# Patient Record
Sex: FEMALE | Race: WHITE | NOT HISPANIC OR LATINO | Employment: UNEMPLOYED | URBAN - METROPOLITAN AREA
[De-identification: names, ages, dates, MRNs, and addresses within clinical notes are randomized per-mention and may not be internally consistent; named-entity substitution may affect disease eponyms.]

---

## 2023-04-27 ENCOUNTER — LAB (OUTPATIENT)
Dept: LAB | Facility: LAB | Age: 83
End: 2023-04-27
Payer: MEDICARE

## 2023-04-27 ENCOUNTER — OFFICE VISIT (OUTPATIENT)
Dept: PRIMARY CARE | Facility: CLINIC | Age: 83
End: 2023-04-27
Payer: MEDICARE

## 2023-04-27 VITALS
TEMPERATURE: 97.8 F | WEIGHT: 130 LBS | OXYGEN SATURATION: 99 % | SYSTOLIC BLOOD PRESSURE: 128 MMHG | HEART RATE: 88 BPM | RESPIRATION RATE: 16 BRPM | DIASTOLIC BLOOD PRESSURE: 70 MMHG | BODY MASS INDEX: 23.4 KG/M2

## 2023-04-27 DIAGNOSIS — R30.0 DYSURIA: Primary | ICD-10-CM

## 2023-04-27 DIAGNOSIS — C50.911 MALIGNANT NEOPLASM OF RIGHT FEMALE BREAST, UNSPECIFIED ESTROGEN RECEPTOR STATUS, UNSPECIFIED SITE OF BREAST (MULTI): ICD-10-CM

## 2023-04-27 DIAGNOSIS — K21.9 GASTROESOPHAGEAL REFLUX DISEASE WITHOUT ESOPHAGITIS: ICD-10-CM

## 2023-04-27 DIAGNOSIS — R30.0 DYSURIA: ICD-10-CM

## 2023-04-27 PROBLEM — E78.5 HYPERLIPIDEMIA: Status: ACTIVE | Noted: 2023-04-27

## 2023-04-27 PROBLEM — J30.9 ALLERGIC RHINITIS: Status: ACTIVE | Noted: 2023-04-27

## 2023-04-27 PROBLEM — I49.9 IRREGULAR HEART BEAT: Status: ACTIVE | Noted: 2023-04-27

## 2023-04-27 PROBLEM — D27.9: Status: ACTIVE | Noted: 2023-04-27

## 2023-04-27 PROBLEM — M89.9 ABNORMAL MOVEMENT IN BONE: Status: ACTIVE | Noted: 2023-04-27

## 2023-04-27 PROBLEM — I10 HYPERTENSION: Status: ACTIVE | Noted: 2023-04-27

## 2023-04-27 PROBLEM — M85.80 OSTEOPENIA: Status: ACTIVE | Noted: 2023-04-27

## 2023-04-27 PROBLEM — R00.2 HEART PALPITATIONS: Status: ACTIVE | Noted: 2023-04-27

## 2023-04-27 PROBLEM — D12.6 TUBULAR ADENOMA OF COLON: Status: ACTIVE | Noted: 2023-04-27

## 2023-04-27 PROBLEM — D64.9 ANEMIA: Status: ACTIVE | Noted: 2023-04-27

## 2023-04-27 PROBLEM — R92.8 ABNORMAL MAMMOGRAM: Status: ACTIVE | Noted: 2023-04-27

## 2023-04-27 PROBLEM — K21.00 ESOPHAGITIS, REFLUX: Status: ACTIVE | Noted: 2023-04-27

## 2023-04-27 LAB
APPEARANCE, URINE: CLEAR
BILIRUBIN, URINE: NEGATIVE
BLOOD, URINE: NEGATIVE
COLOR, URINE: YELLOW
GLUCOSE, URINE: NEGATIVE MG/DL
KETONES, URINE: NEGATIVE MG/DL
LEUKOCYTE ESTERASE, URINE: NEGATIVE
NITRITE, URINE: NEGATIVE
PH, URINE: 6 (ref 5–8)
PROTEIN, URINE: NEGATIVE MG/DL
SPECIFIC GRAVITY, URINE: 1.01 (ref 1–1.03)
UROBILINOGEN, URINE: <2 MG/DL (ref 0–1.9)

## 2023-04-27 PROCEDURE — 1036F TOBACCO NON-USER: CPT | Performed by: INTERNAL MEDICINE

## 2023-04-27 PROCEDURE — 99214 OFFICE O/P EST MOD 30 MIN: CPT | Performed by: INTERNAL MEDICINE

## 2023-04-27 PROCEDURE — 1159F MED LIST DOCD IN RCRD: CPT | Performed by: INTERNAL MEDICINE

## 2023-04-27 PROCEDURE — 3078F DIAST BP <80 MM HG: CPT | Performed by: INTERNAL MEDICINE

## 2023-04-27 PROCEDURE — 87086 URINE CULTURE/COLONY COUNT: CPT

## 2023-04-27 PROCEDURE — 3074F SYST BP LT 130 MM HG: CPT | Performed by: INTERNAL MEDICINE

## 2023-04-27 PROCEDURE — 81003 URINALYSIS AUTO W/O SCOPE: CPT

## 2023-04-27 RX ORDER — SIMVASTATIN 40 MG/1
1 TABLET, FILM COATED ORAL NIGHTLY
COMMUNITY
Start: 2015-05-04 | End: 2023-10-24 | Stop reason: SDUPTHER

## 2023-04-27 RX ORDER — LYSINE HCL 500 MG
2 TABLET ORAL DAILY
COMMUNITY
Start: 2013-05-15

## 2023-04-27 RX ORDER — MELATONIN 10 MG/ML
DROPS ORAL
COMMUNITY
Start: 2013-05-15

## 2023-04-27 RX ORDER — ANASTROZOLE 1 MG/1
1 TABLET ORAL DAILY
COMMUNITY

## 2023-04-27 RX ORDER — FAMOTIDINE 20 MG/1
20 TABLET, FILM COATED ORAL 2 TIMES DAILY
Qty: 30 TABLET | Refills: 2 | Status: SHIPPED | OUTPATIENT
Start: 2023-04-27 | End: 2023-10-24 | Stop reason: ALTCHOICE

## 2023-04-27 RX ORDER — LISINOPRIL 20 MG/1
1 TABLET ORAL DAILY
COMMUNITY
Start: 2012-11-14 | End: 2023-10-24 | Stop reason: SDUPTHER

## 2023-04-27 RX ORDER — ACETAMINOPHEN 325 MG/1
TABLET ORAL 3 TIMES DAILY PRN
COMMUNITY

## 2023-04-27 RX ORDER — MULTIVITAMIN
1 TABLET ORAL DAILY
COMMUNITY

## 2023-04-27 ASSESSMENT — ENCOUNTER SYMPTOMS
CONSTIPATION: 0
DIARRHEA: 1
PALPITATIONS: 1
DYSURIA: 1
FREQUENCY: 1
BLOOD IN STOOL: 0

## 2023-04-27 ASSESSMENT — PATIENT HEALTH QUESTIONNAIRE - PHQ9
2. FEELING DOWN, DEPRESSED OR HOPELESS: NOT AT ALL
SUM OF ALL RESPONSES TO PHQ9 QUESTIONS 1 AND 2: 0
1. LITTLE INTEREST OR PLEASURE IN DOING THINGS: NOT AT ALL

## 2023-04-27 NOTE — PROGRESS NOTES
Patient here for a follow up    Subjective   Patient ID: Ileana Edgar is a 82 y.o. female who presents for Follow-up.  She is accompanied by her  who offers some of the history.    The patient mentions a one month history of sour taste, abdominal discomfort, and abdominal bloating unresponsive to Prilosec for nine days.  This has been affecting her sleep, and she has found that Gas-X has been helping.  She also tried taking 1 teaspoon of Metamucil with water which seemed to exacerbates symptoms significantly.  The symptoms were associated with urinary frequency and dysuria and completed a Urinalysis which was negative.  Since then she has noticed some diarrhea, and tenesmus.  She has stopped eating in the evenings in an effort to lose weight, and has lost 10 lbs since 10/2022.  She denies any hematochezia or melena.      The patient mentions occasional palpitations particularly when she is upset or nervous.      The patient completed her Mammogram in 1/2023 which she recalls was unremarkable. She also has an upcoming Bone Density Scan to monitor the progress with Prolia injections.      Review of Systems   Cardiovascular:  Positive for palpitations.   Gastrointestinal:  Positive for diarrhea. Negative for blood in stool and constipation.   Genitourinary:  Positive for dysuria and frequency.     Objective   Physical Exam  Constitutional:       Appearance: Normal appearance.   Cardiovascular:      Rate and Rhythm: Normal rate and regular rhythm.      Heart sounds: Normal heart sounds.   Pulmonary:      Effort: Pulmonary effort is normal.      Breath sounds: Normal breath sounds.   Abdominal:      General: Bowel sounds are normal.      Palpations: Abdomen is soft.      Tenderness: There is no abdominal tenderness. There is no guarding.   Skin:     General: Skin is warm and dry.   Neurological:      General: No focal deficit present.      Mental Status: She is alert and oriented to person, place, and time. Mental  status is at baseline.   Psychiatric:         Mood and Affect: Mood normal.         Behavior: Behavior normal.         Assessment/Plan   Problem List Items Addressed This Visit       Malignant neoplasm of right female breast (CMS/HCC)     Other Visit Diagnoses       Dysuria    -  Primary    Relevant Orders    Urinalysis with Reflex Microscopic    Urine Culture    Gastroesophageal reflux disease without esophagitis        Relevant Medications    famotidine (Pepcid) 20 mg tablet            IMPRESSIONS/PLAN:     Urinary Frequency/Dysuria  - Ordered Urinalysis with reflex microscopy, and Urine Culture.    Sour Taste/Abdominal Bloating  - Prescribed famotidine 20 mg BID for 30 day trial, and Metamucil at 0.5 teaspoon in a glass of water.  Call the clinic if symptoms persist or worsen.    Irregular Heartbeat   - Holter Monitor 4/2022 showed sinus bradycardia (57) to sinus tachycardia (167) with frequent SVT ectopy. It did show some runs of SVT (24 BPM was the longest). This is likely what her monitor was picking up. She remains completely asymptomatic- no fluttering, palpitations, dizziness, or other physical symptoms of this.      HTN   - /70 today in office.  Continue on lisinopril 20mg QD.      HLD   - Stable, continue on simvastatin 40mg QD.     Breast Cancer   - Diagnosed 2/2021 Stage 3A, s/p right mastectomy on 3/10/2021. Currently following Hematology/Oncology, previously Following with Dr. Laurent from Breast Surgery, maintained on anastrozole 1mg QD and Prolia injections.     Osteopenia   - Takes Calcium 600+D 600-400mg 2 tabs daily. She is also on Prolia.     Vitamin D Deficiency   - Takes Vitamin D3 50mcg QD.      Health Maintenance   - Routine labs 10/2022. Last Mammogram 1/2023 per patient. Last colonoscopy 7/2022, no repeat due to age.     Follow up according to regular health maintenance, call sooner if needed.       Scribe Attestation  By signing my name below, IKellee , Kanchan   attest  that this documentation has been prepared under the direction and in the presence of Enrrique Hernandez DO.

## 2023-04-28 LAB — URINE CULTURE: NORMAL

## 2023-10-24 ENCOUNTER — OFFICE VISIT (OUTPATIENT)
Dept: PRIMARY CARE | Facility: CLINIC | Age: 83
End: 2023-10-24
Payer: MEDICARE

## 2023-10-24 ENCOUNTER — LAB (OUTPATIENT)
Dept: LAB | Facility: LAB | Age: 83
End: 2023-10-24
Payer: MEDICARE

## 2023-10-24 VITALS
RESPIRATION RATE: 16 BRPM | BODY MASS INDEX: 23.4 KG/M2 | WEIGHT: 130 LBS | OXYGEN SATURATION: 99 % | DIASTOLIC BLOOD PRESSURE: 80 MMHG | TEMPERATURE: 97.3 F | HEART RATE: 81 BPM | SYSTOLIC BLOOD PRESSURE: 160 MMHG

## 2023-10-24 DIAGNOSIS — I10 PRIMARY HYPERTENSION: ICD-10-CM

## 2023-10-24 DIAGNOSIS — E78.2 MIXED HYPERLIPIDEMIA: Primary | ICD-10-CM

## 2023-10-24 DIAGNOSIS — E78.2 MIXED HYPERLIPIDEMIA: ICD-10-CM

## 2023-10-24 LAB
ALBUMIN SERPL BCP-MCNC: 4.3 G/DL (ref 3.4–5)
ALP SERPL-CCNC: 56 U/L (ref 33–136)
ALT SERPL W P-5'-P-CCNC: 20 U/L (ref 7–45)
ANION GAP SERPL CALC-SCNC: 14 MMOL/L (ref 10–20)
AST SERPL W P-5'-P-CCNC: 23 U/L (ref 9–39)
BASOPHILS # BLD AUTO: 0.03 X10*3/UL (ref 0–0.1)
BASOPHILS NFR BLD AUTO: 0.5 %
BILIRUB SERPL-MCNC: 0.6 MG/DL (ref 0–1.2)
BUN SERPL-MCNC: 14 MG/DL (ref 6–23)
CALCIUM SERPL-MCNC: 9.8 MG/DL (ref 8.6–10.3)
CHLORIDE SERPL-SCNC: 102 MMOL/L (ref 98–107)
CHOLEST SERPL-MCNC: 159 MG/DL (ref 0–199)
CHOLESTEROL/HDL RATIO: 2
CO2 SERPL-SCNC: 29 MMOL/L (ref 21–32)
CREAT SERPL-MCNC: 0.77 MG/DL (ref 0.5–1.05)
EOSINOPHIL # BLD AUTO: 0.09 X10*3/UL (ref 0–0.4)
EOSINOPHIL NFR BLD AUTO: 1.6 %
ERYTHROCYTE [DISTWIDTH] IN BLOOD BY AUTOMATED COUNT: 12.6 % (ref 11.5–14.5)
GFR SERPL CREATININE-BSD FRML MDRD: 76 ML/MIN/1.73M*2
GLUCOSE SERPL-MCNC: 94 MG/DL (ref 74–99)
HCT VFR BLD AUTO: 36.6 % (ref 36–46)
HDLC SERPL-MCNC: 80.9 MG/DL
HGB BLD-MCNC: 12 G/DL (ref 12–16)
IMM GRANULOCYTES # BLD AUTO: 0.01 X10*3/UL (ref 0–0.5)
IMM GRANULOCYTES NFR BLD AUTO: 0.2 % (ref 0–0.9)
LDLC SERPL CALC-MCNC: 63 MG/DL
LYMPHOCYTES # BLD AUTO: 1.29 X10*3/UL (ref 0.8–3)
LYMPHOCYTES NFR BLD AUTO: 23.3 %
MCH RBC QN AUTO: 33.6 PG (ref 26–34)
MCHC RBC AUTO-ENTMCNC: 32.8 G/DL (ref 32–36)
MCV RBC AUTO: 103 FL (ref 80–100)
MONOCYTES # BLD AUTO: 0.61 X10*3/UL (ref 0.05–0.8)
MONOCYTES NFR BLD AUTO: 11 %
NEUTROPHILS # BLD AUTO: 3.5 X10*3/UL (ref 1.6–5.5)
NEUTROPHILS NFR BLD AUTO: 63.4 %
NON HDL CHOLESTEROL: 78 MG/DL (ref 0–149)
NRBC BLD-RTO: 0 /100 WBCS (ref 0–0)
PLATELET # BLD AUTO: 224 X10*3/UL (ref 150–450)
PMV BLD AUTO: 9.7 FL (ref 7.5–11.5)
POTASSIUM SERPL-SCNC: 4.1 MMOL/L (ref 3.5–5.3)
PROT SERPL-MCNC: 7.1 G/DL (ref 6.4–8.2)
RBC # BLD AUTO: 3.57 X10*6/UL (ref 4–5.2)
SODIUM SERPL-SCNC: 141 MMOL/L (ref 136–145)
TRIGL SERPL-MCNC: 74 MG/DL (ref 0–149)
VLDL: 15 MG/DL (ref 0–40)
WBC # BLD AUTO: 5.5 X10*3/UL (ref 4.4–11.3)

## 2023-10-24 PROCEDURE — 80053 COMPREHEN METABOLIC PANEL: CPT

## 2023-10-24 PROCEDURE — 85025 COMPLETE CBC W/AUTO DIFF WBC: CPT

## 2023-10-24 PROCEDURE — 99214 OFFICE O/P EST MOD 30 MIN: CPT | Performed by: INTERNAL MEDICINE

## 2023-10-24 PROCEDURE — 1160F RVW MEDS BY RX/DR IN RCRD: CPT | Performed by: INTERNAL MEDICINE

## 2023-10-24 PROCEDURE — 3077F SYST BP >= 140 MM HG: CPT | Performed by: INTERNAL MEDICINE

## 2023-10-24 PROCEDURE — 80061 LIPID PANEL: CPT

## 2023-10-24 PROCEDURE — 1036F TOBACCO NON-USER: CPT | Performed by: INTERNAL MEDICINE

## 2023-10-24 PROCEDURE — 1159F MED LIST DOCD IN RCRD: CPT | Performed by: INTERNAL MEDICINE

## 2023-10-24 PROCEDURE — 36415 COLL VENOUS BLD VENIPUNCTURE: CPT

## 2023-10-24 PROCEDURE — 1170F FXNL STATUS ASSESSED: CPT | Performed by: INTERNAL MEDICINE

## 2023-10-24 PROCEDURE — 3079F DIAST BP 80-89 MM HG: CPT | Performed by: INTERNAL MEDICINE

## 2023-10-24 PROCEDURE — G0439 PPPS, SUBSEQ VISIT: HCPCS | Performed by: INTERNAL MEDICINE

## 2023-10-24 RX ORDER — LISINOPRIL 20 MG/1
20 TABLET ORAL DAILY
Qty: 90 TABLET | Refills: 3 | Status: SHIPPED | OUTPATIENT
Start: 2023-10-24

## 2023-10-24 RX ORDER — SIMVASTATIN 40 MG/1
40 TABLET, FILM COATED ORAL NIGHTLY
Qty: 90 TABLET | Refills: 3 | Status: SHIPPED | OUTPATIENT
Start: 2023-10-24

## 2023-10-24 ASSESSMENT — ACTIVITIES OF DAILY LIVING (ADL)
MANAGING_FINANCES: INDEPENDENT
DOING_HOUSEWORK: INDEPENDENT
GROCERY_SHOPPING: INDEPENDENT
TAKING_MEDICATION: INDEPENDENT
DRESSING: INDEPENDENT
BATHING: INDEPENDENT

## 2023-10-24 ASSESSMENT — ENCOUNTER SYMPTOMS
CONSTIPATION: 0
DIARRHEA: 1
BLOOD IN STOOL: 0
ABDOMINAL PAIN: 0
TROUBLE SWALLOWING: 0

## 2023-10-24 ASSESSMENT — PATIENT HEALTH QUESTIONNAIRE - PHQ9
SUM OF ALL RESPONSES TO PHQ9 QUESTIONS 1 AND 2: 0
2. FEELING DOWN, DEPRESSED OR HOPELESS: NOT AT ALL
1. LITTLE INTEREST OR PLEASURE IN DOING THINGS: NOT AT ALL

## 2023-10-24 NOTE — PROGRESS NOTES
Patient here for a medicare wellness visit     Subjective   Patient ID: Ileana Edgar is a 83 y.o. female who presents for Medicare Annual Wellness Visit Subsequent. She is accompanied by her  who offers some of the history.     The patient tried taking famotidine 20 mg twice daily, but switched to once daily due to dizziness and minimal benefit.  She also tried taking Metamucil and was unable to tolerate the supplementation due to vomiting.  Since 5/2023, she reports increased bowel frequency with loose stools that requires straining in the shape of worms.  The patient suspects she is lactose and high fructose corn syrup intolerant.  Her last colonoscopy was in 7/2022 and was confirmed to have a large asymptomatic internal and external hemorrhoids.  She denies any dysphagia, hematochezia, melena, or abdominal pain.    The patient is up to date with Covid-19 booster and Influenza immunization.      Review of Systems   HENT:  Negative for trouble swallowing.    Gastrointestinal:  Positive for diarrhea. Negative for abdominal pain, blood in stool and constipation.        Positive for hemorrhoid.     Objective   Physical Exam  Constitutional:       Appearance: Normal appearance.   Neck:      Vascular: No carotid bruit.   Cardiovascular:      Rate and Rhythm: Normal rate and regular rhythm.      Heart sounds: Normal heart sounds.   Pulmonary:      Effort: Pulmonary effort is normal.      Breath sounds: Normal breath sounds.   Abdominal:      General: Bowel sounds are normal.      Palpations: Abdomen is soft.      Tenderness: There is no abdominal tenderness. There is no guarding or rebound.   Skin:     General: Skin is warm and dry.   Neurological:      General: No focal deficit present.      Mental Status: She is alert and oriented to person, place, and time. Mental status is at baseline.   Psychiatric:         Mood and Affect: Mood normal.         Behavior: Behavior normal.       Assessment/Plan   Problem List  Items Addressed This Visit             ICD-10-CM    Hyperlipidemia - Primary E78.5    Relevant Medications    simvastatin (Zocor) 40 mg tablet    Other Relevant Orders    CBC and Auto Differential    Comprehensive metabolic panel    Lipid panel    Hypertension I10    Relevant Medications    lisinopril 20 mg tablet       Medicare Wellness Examination Done  -  Discussed healthy diet and regular exercise.    -  Physical exam overall unremarkable. Immunizations reviewed and updated accordingly. Healthy lifestyle choices discussed (tobacco avoidance, appropriate alcohol use, avoidance of illicit substances).   -  Patient is wearing seatbelt.   -  Screening lab work ordered as indicated.    -  Age appropriate screening tests reviewed with patient.       IMPRESSIONS/PLAN:     Loose Stool  - Advised patient to increase dietary fiber.  Call the clinic if watery diarrhea recurs, and will order Stool Pathogen Panel PCR and other testing.  Last colonoscopy 7/2022 showed large asymptomatic internal and external hemorrhoids.     HTN   - /80 today in office.  Continue on lisinopril 20mg QD.  Monitor.     HLD   - Stable, continue on simvastatin 40mg QD.     Breast Cancer   - Diagnosed 2/2021 Stage 3A, s/p right mastectomy on 3/10/2021. Currently following Hematology/Oncology, previously Following with Dr. Laurent from Breast Surgery, maintained on anastrozole 1mg QD and Prolia injections.     Osteopenia   - Takes Calcium 600+D 600-400mg 2 tabs daily. She is also on Prolia.     Sour Taste/Abdominal Bloating  - Patient stopped famotidine 20 mg BID early due to intolerance, and stopped Metamucil due to vomiting.     Irregular Heartbeat   - Holter Monitor 4/2022 showed sinus bradycardia (57) to sinus tachycardia (167) with frequent SVT ectopy. It did show some runs of SVT (24 BPM was the longest). This is likely what her monitor was picking up. She remains completely asymptomatic- no fluttering, palpitations, dizziness, or  other physical symptoms of this.     Vitamin D Deficiency   - Takes Vitamin D3 50mcg QD.      Health Maintenance   - Routine labs ordered including CBC, CMP, and a lipid panel to be completed in the fasting state.  Last Mammogram 1/2023 per patient. Last colonoscopy 7/2022, no repeat due to age.     Follow up according to regular health maintenance, call sooner if needed.       Scribe Attestation  By signing my name below, IKellee Scribe   attest that this documentation has been prepared under the direction and in the presence of Enrrique Hernandez DO.

## 2024-04-23 ENCOUNTER — OFFICE VISIT (OUTPATIENT)
Dept: PRIMARY CARE | Facility: CLINIC | Age: 84
End: 2024-04-23
Payer: MEDICARE

## 2024-04-23 VITALS
OXYGEN SATURATION: 98 % | BODY MASS INDEX: 22.68 KG/M2 | SYSTOLIC BLOOD PRESSURE: 180 MMHG | HEART RATE: 81 BPM | TEMPERATURE: 97.4 F | RESPIRATION RATE: 16 BRPM | WEIGHT: 126 LBS | DIASTOLIC BLOOD PRESSURE: 90 MMHG

## 2024-04-23 DIAGNOSIS — C50.911 MALIGNANT NEOPLASM OF RIGHT FEMALE BREAST, UNSPECIFIED ESTROGEN RECEPTOR STATUS, UNSPECIFIED SITE OF BREAST (MULTI): Primary | ICD-10-CM

## 2024-04-23 PROCEDURE — 1159F MED LIST DOCD IN RCRD: CPT | Performed by: INTERNAL MEDICINE

## 2024-04-23 PROCEDURE — 1036F TOBACCO NON-USER: CPT | Performed by: INTERNAL MEDICINE

## 2024-04-23 PROCEDURE — G2211 COMPLEX E/M VISIT ADD ON: HCPCS | Performed by: INTERNAL MEDICINE

## 2024-04-23 PROCEDURE — 3077F SYST BP >= 140 MM HG: CPT | Performed by: INTERNAL MEDICINE

## 2024-04-23 PROCEDURE — 99214 OFFICE O/P EST MOD 30 MIN: CPT | Performed by: INTERNAL MEDICINE

## 2024-04-23 PROCEDURE — 3080F DIAST BP >= 90 MM HG: CPT | Performed by: INTERNAL MEDICINE

## 2024-04-23 PROCEDURE — 1160F RVW MEDS BY RX/DR IN RCRD: CPT | Performed by: INTERNAL MEDICINE

## 2024-04-23 ASSESSMENT — PATIENT HEALTH QUESTIONNAIRE - PHQ9
2. FEELING DOWN, DEPRESSED OR HOPELESS: NOT AT ALL
1. LITTLE INTEREST OR PLEASURE IN DOING THINGS: NOT AT ALL
SUM OF ALL RESPONSES TO PHQ9 QUESTIONS 1 AND 2: 0

## 2024-04-23 ASSESSMENT — ENCOUNTER SYMPTOMS
ABDOMINAL PAIN: 0
BLOOD IN STOOL: 0
CONSTIPATION: 0
FREQUENCY: 0
DIARRHEA: 0

## 2024-04-23 NOTE — PROGRESS NOTES
Patient here for a 6 month follow up    Subjective   Patient ID: Ileana Edgar is a 83 y.o. female who presents for Follow-up.  She is accompanied by her  who offers some of the history.     The patient reports occasional elevated systolic readings at home in the 130s mmHg  Her  suspects this is associated with Doctor's visits.  Her blood pressure in the clinic today was initially 180/90 mmHg, and 140/80 mmHg on repeat.  The patient is maintained with lisinopril 20mg once daily.  She denies any chest pressure or chest pain.    The patient follows with Hematology/Oncology for a history of right-sided breast cancer twice a year, and notes that the condition is stable.  The last screening Mammogram was 1/2024.  The patient is maintained with anastrozole 1mg once daily, and is tolerating the medication well.    The patient notes occasional right shoulder pain which she suspects is due to osteoarthritis. She recalls that the condition responded well in the past to a corticosteroid injection and physical therapy.  The patient experienced a fall in 2023 which she believes exacerbated the condition.  She has tried Aspercreme with little benefit.    The patient denies any abdominal pain, hematochezia, melena, bowel problems, or urinary symptoms.      Review of Systems   Cardiovascular:  Negative for chest pain.   Gastrointestinal:  Negative for abdominal pain, blood in stool, constipation and diarrhea.   Genitourinary:  Negative for frequency.   Musculoskeletal:         Positive for right shoulder pain.       Objective   Physical Exam  Constitutional:       Appearance: Normal appearance.   Neck:      Vascular: No carotid bruit.   Cardiovascular:      Rate and Rhythm: Normal rate and regular rhythm.      Heart sounds: Normal heart sounds.   Pulmonary:      Effort: Pulmonary effort is normal.      Breath sounds: Normal breath sounds.   Abdominal:      General: Bowel sounds are normal.      Palpations: Abdomen is  soft.      Tenderness: There is no abdominal tenderness.   Skin:     General: Skin is warm and dry.   Neurological:      General: No focal deficit present.      Mental Status: She is alert and oriented to person, place, and time. Mental status is at baseline.   Psychiatric:         Mood and Affect: Mood normal.         Behavior: Behavior normal.         Assessment/Plan   Problem List Items Addressed This Visit             ICD-10-CM    Malignant neoplasm of right female breast (Multi) - Primary C50.911    Relevant Orders    Disability Placard       IMPRESSIONS/PLAN:      Right Shoulder Pain  - Advised patient to try topical products containing lidocaine OTC.  Continue with stretching exercises.    HTN   - /90 today in office, 140/80 on repeat.  Continue on lisinopril 20mg QD.  Monitor for now.     HLD   - Stable, continue on simvastatin 40mg QD.     Right Breast Cancer   - Diagnosed 2/2021 Stage 3A, s/p right mastectomy on 3/10/2021. Currently following Hematology/Oncology, previously Following with Dr. Laurent from Breast Surgery, maintained on anastrozole 1mg QD and Prolia injections.     Osteopenia   - Takes Calcium 600+D 600-400mg 2 tabs daily. She is also on Prolia.     Sour Taste/Abdominal Bloating  - Patient stopped famotidine 20 mg BID early due to intolerance, and stopped Metamucil due to vomiting.     Irregular Heartbeat   - Holter Monitor 4/2022 showed sinus bradycardia (57) to sinus tachycardia (167) with frequent SVT ectopy. It did show some runs of SVT (24 BPM was the longest). This is likely what her monitor was picking up. She remains completely asymptomatic- no fluttering, palpitations, dizziness, or other physical symptoms of this.      Loose Stool  - Advised patient to increase dietary fiber.  Call the clinic if watery diarrhea recurs, and will order Stool Pathogen Panel PCR and other testing.  Last colonoscopy 7/2022 showed large asymptomatic internal and external hemorrhoids.     Vitamin  D Deficiency   - Takes Vitamin D3 50mcg QD.      Health Maintenance   - Routine labs 10/2023.  Last Mammogram 1/2024. Last colonoscopy 7/2022, no repeat due to age.  Ordered Disability Placard.     Follow up in 6 months, call sooner if needed.       Scribe Attestation  By signing my name below, I, Kellee Zambrano, Felipeibe   attest that this documentation has been prepared under the direction and in the presence of Enrrique Hernandez DO.   Kellee Zambrano 04/23/24 9:29 AM

## 2024-07-30 ENCOUNTER — OFFICE VISIT (OUTPATIENT)
Dept: PRIMARY CARE | Facility: CLINIC | Age: 84
End: 2024-07-30
Payer: MEDICARE

## 2024-07-30 VITALS
BODY MASS INDEX: 22.14 KG/M2 | OXYGEN SATURATION: 99 % | HEART RATE: 98 BPM | SYSTOLIC BLOOD PRESSURE: 158 MMHG | WEIGHT: 123 LBS | RESPIRATION RATE: 16 BRPM | DIASTOLIC BLOOD PRESSURE: 90 MMHG | TEMPERATURE: 97.8 F

## 2024-07-30 DIAGNOSIS — R00.2 PALPITATIONS: Primary | ICD-10-CM

## 2024-07-30 PROCEDURE — 1159F MED LIST DOCD IN RCRD: CPT | Performed by: INTERNAL MEDICINE

## 2024-07-30 PROCEDURE — 1158F ADVNC CARE PLAN TLK DOCD: CPT | Performed by: INTERNAL MEDICINE

## 2024-07-30 PROCEDURE — 3077F SYST BP >= 140 MM HG: CPT | Performed by: INTERNAL MEDICINE

## 2024-07-30 PROCEDURE — 3080F DIAST BP >= 90 MM HG: CPT | Performed by: INTERNAL MEDICINE

## 2024-07-30 PROCEDURE — 1160F RVW MEDS BY RX/DR IN RCRD: CPT | Performed by: INTERNAL MEDICINE

## 2024-07-30 PROCEDURE — 99214 OFFICE O/P EST MOD 30 MIN: CPT | Performed by: INTERNAL MEDICINE

## 2024-07-30 PROCEDURE — 1036F TOBACCO NON-USER: CPT | Performed by: INTERNAL MEDICINE

## 2024-07-30 PROCEDURE — 1123F ACP DISCUSS/DSCN MKR DOCD: CPT | Performed by: INTERNAL MEDICINE

## 2024-07-30 RX ORDER — CARVEDILOL 3.12 MG/1
3.12 TABLET ORAL 2 TIMES DAILY
Qty: 60 TABLET | Refills: 2 | Status: SHIPPED | OUTPATIENT
Start: 2024-07-30 | End: 2025-07-30

## 2024-07-30 NOTE — PROGRESS NOTES
Patient here for a follow up from ER from pain arms and legs - thought she was having a heart attack. Discuss blood pressure medication     Subjective   Patient ID: Ileana Edgar is a 83 y.o. female who presents for Follow-up.  Ileana is here today for follow-up from the ER.  She has been having some intermittent tingling in her extremities as well as what she describes as heart palpitations.  This has been going on for 5-6 days or so and she went to the ER last night.  She states they ran several tests and was told to follow-up with us for her BP.  She denies any chest pain, chest pressure, back pain, or shortness of breath.  No pain with inspiration.  Limited record review from the ER showed a normal X-ray of the chest and normal lab work as well.   She tells me she had a normal EKG as well.  She has been on lisinopril for her BP for years and this was last increased in 2018 or so.  She has also been on Arimidex for her history of breast cancer and questions if she may be starting to have side effects from this.        Review of Systems   All other systems reviewed and are negative.      Objective   Physical Exam  Constitutional:       Appearance: Normal appearance.   Neck:      Vascular: No carotid bruit.   Cardiovascular:      Rate and Rhythm: Normal rate and regular rhythm.      Heart sounds: Normal heart sounds.   Pulmonary:      Effort: Pulmonary effort is normal.      Breath sounds: Normal breath sounds.   Abdominal:      General: Bowel sounds are normal.      Palpations: Abdomen is soft.      Tenderness: There is no abdominal tenderness.   Skin:     General: Skin is warm and dry.   Neurological:      General: No focal deficit present.      Mental Status: She is alert and oriented to person, place, and time. Mental status is at baseline.   Psychiatric:         Mood and Affect: Mood normal.         Behavior: Behavior normal.       Assessment/Plan   Problem List Items Addressed This Visit   "  None      IMPRESSIONS/PLAN:        Heart fluttering  -Described as her being able to \"feel her heart beat\"  No palpitations per say.  -Labs reviewed from ER and were stable including Mg+ and K+  -EKG normal per patient  -She is NSR on exam today.  NIHSS 0.  -Will start Carvedilol 3.125 mg BID.  This should help with sensation of palpations as well as help lower her BP a bit which I think is mostly causing her symptoms  -If persists- can order an echo and monitor.  -Close follow-up in 1 week.  Call sooner if worsening.  -She had monitor in 2022 which showed brief runs of SVT and PVCs- no signs of Afib    HTN   - /90 today in office.  Continue on lisinopril 20mg QD.  Monitor for now.  Will add Coreg as noted above.     HLD   - Stable, continue on simvastatin 40mg QD.     Right Breast Cancer   - Diagnosed 2/2021 Stage 3A, s/p right mastectomy on 3/10/2021. Currently following Hematology/Oncology, previously Following with Dr. Laurent from Breast Surgery, maintained on anastrozole 1mg QD and Prolia injections.     Right Shoulder Pain  - Advised patient to try topical products containing lidocaine OTC.  Continue with stretching exercises.    Osteopenia   - Takes Calcium 600+D 600-400mg 2 tabs daily. She is also on Prolia.     Sour Taste/Abdominal Bloating  - Patient stopped famotidine 20 mg BID early due to intolerance, and stopped Metamucil due to vomiting.     Irregular Heartbeat   - Holter Monitor 4/2022 showed sinus bradycardia (57) to sinus tachycardia (167) with frequent SVT ectopy. It did show some runs of SVT (24 BPM was the longest). This is likely what her monitor was picking up. She remains completely asymptomatic- no fluttering, palpitations, dizziness, or other physical symptoms of this.      Loose Stool  - Advised patient to increase dietary fiber.  Call the clinic if watery diarrhea recurs, and will order Stool Pathogen Panel PCR and other testing.  Last colonoscopy 7/2022 showed large " asymptomatic internal and external hemorrhoids.      Vitamin D Deficiency   - Takes Vitamin D3 50mcg QD.      Health Maintenance   - Routine labs 10/2023.  Last Mammogram 1/2024. Last colonoscopy 7/2022, no repeat due to age.       Follow up in 6 months, call sooner if needed.       Scribe Attestation  By signing my name below, I, Kellee Zambrano, Kanchan   attest that this documentation has been prepared under the direction and in the presence of Enrrique Hernandez DO.   Kellee Zambrano 07/30/24 10:51 AM

## 2024-08-06 ENCOUNTER — APPOINTMENT (OUTPATIENT)
Dept: PRIMARY CARE | Facility: CLINIC | Age: 84
End: 2024-08-06
Payer: MEDICARE

## 2024-08-06 VITALS
TEMPERATURE: 97.3 F | OXYGEN SATURATION: 98 % | BODY MASS INDEX: 22.5 KG/M2 | WEIGHT: 125 LBS | HEART RATE: 65 BPM | SYSTOLIC BLOOD PRESSURE: 128 MMHG | RESPIRATION RATE: 16 BRPM | DIASTOLIC BLOOD PRESSURE: 70 MMHG

## 2024-08-06 DIAGNOSIS — R00.2 PALPITATIONS: Primary | ICD-10-CM

## 2024-08-06 PROCEDURE — 1159F MED LIST DOCD IN RCRD: CPT | Performed by: INTERNAL MEDICINE

## 2024-08-06 PROCEDURE — 1160F RVW MEDS BY RX/DR IN RCRD: CPT | Performed by: INTERNAL MEDICINE

## 2024-08-06 PROCEDURE — 99214 OFFICE O/P EST MOD 30 MIN: CPT | Performed by: INTERNAL MEDICINE

## 2024-08-06 PROCEDURE — 1158F ADVNC CARE PLAN TLK DOCD: CPT | Performed by: INTERNAL MEDICINE

## 2024-08-06 PROCEDURE — 1123F ACP DISCUSS/DSCN MKR DOCD: CPT | Performed by: INTERNAL MEDICINE

## 2024-08-06 PROCEDURE — G2211 COMPLEX E/M VISIT ADD ON: HCPCS | Performed by: INTERNAL MEDICINE

## 2024-08-06 PROCEDURE — 1036F TOBACCO NON-USER: CPT | Performed by: INTERNAL MEDICINE

## 2024-08-06 PROCEDURE — 3078F DIAST BP <80 MM HG: CPT | Performed by: INTERNAL MEDICINE

## 2024-08-06 PROCEDURE — 3074F SYST BP LT 130 MM HG: CPT | Performed by: INTERNAL MEDICINE

## 2024-08-06 RX ORDER — CARVEDILOL 3.12 MG/1
3.12 TABLET ORAL 2 TIMES DAILY
Qty: 180 TABLET | Refills: 3 | Status: SHIPPED | OUTPATIENT
Start: 2024-08-06 | End: 2025-08-06

## 2024-08-06 ASSESSMENT — ENCOUNTER SYMPTOMS: PALPITATIONS: 1

## 2024-08-06 NOTE — PROGRESS NOTES
"Patient here for a 1 week follow up    Subjective   Patient ID: Ileana Edgar is a 83 y.o. female who presents for Follow-up.    The patient states that the sensation of palpitations has decreased in frequency since starting carvedilol 3.125mg twice daily one week prior.  She is tolerating the medication relatively well, and her blood pressure in the office today was 128/70 mmHg.      The patient previously worked for [employer] and has a degree in Mathematics and Physics.       Review of Systems   Cardiovascular:  Positive for palpitations.     Objective   Physical Exam  Constitutional:       Appearance: Normal appearance.   Neck:      Vascular: No carotid bruit.   Cardiovascular:      Rate and Rhythm: Normal rate and regular rhythm.      Heart sounds: Normal heart sounds.   Pulmonary:      Effort: Pulmonary effort is normal.      Breath sounds: Normal breath sounds.   Abdominal:      General: Bowel sounds are normal.      Palpations: Abdomen is soft.      Tenderness: There is no abdominal tenderness.   Skin:     General: Skin is warm and dry.   Neurological:      General: No focal deficit present.      Mental Status: She is alert and oriented to person, place, and time. Mental status is at baseline.   Psychiatric:         Mood and Affect: Mood normal.         Behavior: Behavior normal.       Assessment/Plan   Problem List Items Addressed This Visit    None  Visit Diagnoses         Codes    Palpitations    -  Primary R00.2    Relevant Medications    carvedilol (Coreg) 3.125 mg tablet    Other Relevant Orders    Transthoracic Echo (TTE) Complete            IMPRESSIONS/PLAN:     Palpitations  -Described as her being able to \"feel her heart beat\"  No palpitations per say.  Labs reviewed from ER and were stable including Mg+ and K+.  EKG normal per patient.  She is NSR on exam today.  NIHSS 0.  Previously started Carvedilol 3.125 mg BID.  This seems to be working very well.  No longer symptomatic.  BP well controlled too.  " Home readings have been acceptable.  She had monitor in 2022 which showed brief runs of SVT and PVCs- no signs of Afib.  Ordered Echocardiogram.      HTN   - /70 today in office.  Continue carvedilol 3.125mg BID and lisinopril 20mg QD.  Tolerating well.     HLD   - Stable, continue on simvastatin 40mg QD.     Right Breast Cancer   - Diagnosed 2/2021 Stage 3A, s/p right mastectomy on 3/10/2021. Currently following Hematology/Oncology, previously Following with Dr. Laurent from Breast Surgery, maintained on anastrozole 1mg QD and Prolia injections.     Right Shoulder Pain  - Advised patient to try topical products containing lidocaine OTC.  Continue with stretching exercises.     Osteopenia   - Takes Calcium 600+D 600-400mg 2 tabs daily. She is also on Prolia.     Sour Taste/Abdominal Bloating  - Patient stopped famotidine 20 mg BID early due to intolerance, and stopped Metamucil due to vomiting.     Loose Stool  - Advised patient to increase dietary fiber.  Call the clinic if watery diarrhea recurs, and will order Stool Pathogen Panel PCR and other testing.  Last colonoscopy 7/2022 showed large asymptomatic internal and external hemorrhoids.      Vitamin D Deficiency   - Takes Vitamin D3 50mcg QD.      Health Maintenance   - Routine labs 10/2023.  Last Mammogram 1/2024. Last colonoscopy 7/2022, no repeat due to age.       Follow up in 6 months, call sooner if needed.       Scribe Attestation  By signing my name below, IKellee, Kanchan   attest that this documentation has been prepared under the direction and in the presence of Enrrique Hernandez DO.   Kellee Zambrano 08/06/24 10:52 AM

## 2024-08-23 ENCOUNTER — HOSPITAL ENCOUNTER (OUTPATIENT)
Dept: CARDIOLOGY | Facility: CLINIC | Age: 84
Discharge: HOME | End: 2024-08-23
Payer: MEDICARE

## 2024-08-23 VITALS
WEIGHT: 125 LBS | SYSTOLIC BLOOD PRESSURE: 134 MMHG | BODY MASS INDEX: 23 KG/M2 | DIASTOLIC BLOOD PRESSURE: 72 MMHG | HEIGHT: 62 IN

## 2024-08-23 DIAGNOSIS — R00.2 PALPITATIONS: ICD-10-CM

## 2024-08-23 PROCEDURE — 93306 TTE W/DOPPLER COMPLETE: CPT

## 2024-08-23 PROCEDURE — 93306 TTE W/DOPPLER COMPLETE: CPT | Performed by: INTERNAL MEDICINE

## 2024-08-25 LAB
AORTIC VALVE MEAN GRADIENT: 3 MMHG
AORTIC VALVE PEAK VELOCITY: 1.1 M/S
AV PEAK GRADIENT: 4.8 MMHG
AVA (PEAK VEL): 2.94 CM2
AVA (VTI): 3.2 CM2
EJECTION FRACTION APICAL 4 CHAMBER: 60
EJECTION FRACTION: 65 %
LEFT VENTRICLE INTERNAL DIMENSION DIASTOLE: 3.7 CM (ref 3.5–6)
LEFT VENTRICULAR OUTFLOW TRACT DIAMETER: 2 CM
MITRAL VALVE E/A RATIO: 0.93
MITRAL VALVE E/E' RATIO: 10.8

## 2024-10-10 DIAGNOSIS — E78.2 MIXED HYPERLIPIDEMIA: ICD-10-CM

## 2024-10-10 DIAGNOSIS — I10 PRIMARY HYPERTENSION: ICD-10-CM

## 2024-10-10 RX ORDER — LISINOPRIL 20 MG/1
20 TABLET ORAL DAILY
Qty: 90 TABLET | Refills: 3 | Status: SHIPPED | OUTPATIENT
Start: 2024-10-10

## 2024-10-10 RX ORDER — SIMVASTATIN 40 MG/1
40 TABLET, FILM COATED ORAL NIGHTLY
Qty: 90 TABLET | Refills: 3 | Status: SHIPPED | OUTPATIENT
Start: 2024-10-10

## 2024-10-23 ENCOUNTER — APPOINTMENT (OUTPATIENT)
Dept: PRIMARY CARE | Facility: CLINIC | Age: 84
End: 2024-10-23
Payer: MEDICARE

## 2024-10-23 ENCOUNTER — LAB (OUTPATIENT)
Dept: LAB | Facility: LAB | Age: 84
End: 2024-10-23
Payer: MEDICARE

## 2024-10-23 VITALS
WEIGHT: 126 LBS | DIASTOLIC BLOOD PRESSURE: 70 MMHG | HEART RATE: 71 BPM | BODY MASS INDEX: 23.05 KG/M2 | TEMPERATURE: 96.7 F | OXYGEN SATURATION: 96 % | RESPIRATION RATE: 16 BRPM | SYSTOLIC BLOOD PRESSURE: 120 MMHG

## 2024-10-23 DIAGNOSIS — E78.2 MIXED HYPERLIPIDEMIA: ICD-10-CM

## 2024-10-23 DIAGNOSIS — I10 PRIMARY HYPERTENSION: ICD-10-CM

## 2024-10-23 DIAGNOSIS — L98.9 SKIN LESION: ICD-10-CM

## 2024-10-23 DIAGNOSIS — E78.2 MIXED HYPERLIPIDEMIA: Primary | ICD-10-CM

## 2024-10-23 LAB
ALBUMIN SERPL BCP-MCNC: 4.2 G/DL (ref 3.4–5)
ALP SERPL-CCNC: 55 U/L (ref 33–136)
ALT SERPL W P-5'-P-CCNC: 14 U/L (ref 7–45)
ANION GAP SERPL CALC-SCNC: 13 MMOL/L (ref 10–20)
AST SERPL W P-5'-P-CCNC: 19 U/L (ref 9–39)
BASOPHILS # BLD AUTO: 0.03 X10*3/UL (ref 0–0.1)
BASOPHILS NFR BLD AUTO: 0.6 %
BILIRUB SERPL-MCNC: 0.8 MG/DL (ref 0–1.2)
BUN SERPL-MCNC: 16 MG/DL (ref 6–23)
CALCIUM SERPL-MCNC: 9.4 MG/DL (ref 8.6–10.6)
CHLORIDE SERPL-SCNC: 105 MMOL/L (ref 98–107)
CHOLEST SERPL-MCNC: 158 MG/DL (ref 0–199)
CHOLESTEROL/HDL RATIO: 1.8
CO2 SERPL-SCNC: 27 MMOL/L (ref 21–32)
CREAT SERPL-MCNC: 0.77 MG/DL (ref 0.5–1.05)
EGFRCR SERPLBLD CKD-EPI 2021: 76 ML/MIN/1.73M*2
EOSINOPHIL # BLD AUTO: 0.04 X10*3/UL (ref 0–0.4)
EOSINOPHIL NFR BLD AUTO: 0.8 %
ERYTHROCYTE [DISTWIDTH] IN BLOOD BY AUTOMATED COUNT: 12.2 % (ref 11.5–14.5)
GLUCOSE SERPL-MCNC: 102 MG/DL (ref 74–99)
HCT VFR BLD AUTO: 35.9 % (ref 36–46)
HDLC SERPL-MCNC: 87.5 MG/DL
HGB BLD-MCNC: 11.9 G/DL (ref 12–16)
IMM GRANULOCYTES # BLD AUTO: 0.01 X10*3/UL (ref 0–0.5)
IMM GRANULOCYTES NFR BLD AUTO: 0.2 % (ref 0–0.9)
LDLC SERPL CALC-MCNC: 53 MG/DL
LYMPHOCYTES # BLD AUTO: 1.2 X10*3/UL (ref 0.8–3)
LYMPHOCYTES NFR BLD AUTO: 24.2 %
MCH RBC QN AUTO: 34.2 PG (ref 26–34)
MCHC RBC AUTO-ENTMCNC: 33.1 G/DL (ref 32–36)
MCV RBC AUTO: 103 FL (ref 80–100)
MONOCYTES # BLD AUTO: 0.65 X10*3/UL (ref 0.05–0.8)
MONOCYTES NFR BLD AUTO: 13.1 %
NEUTROPHILS # BLD AUTO: 3.03 X10*3/UL (ref 1.6–5.5)
NEUTROPHILS NFR BLD AUTO: 61.1 %
NON HDL CHOLESTEROL: 71 MG/DL (ref 0–149)
NRBC BLD-RTO: 0 /100 WBCS (ref 0–0)
PLATELET # BLD AUTO: 201 X10*3/UL (ref 150–450)
POTASSIUM SERPL-SCNC: 4 MMOL/L (ref 3.5–5.3)
PROT SERPL-MCNC: 7 G/DL (ref 6.4–8.2)
RBC # BLD AUTO: 3.48 X10*6/UL (ref 4–5.2)
SODIUM SERPL-SCNC: 141 MMOL/L (ref 136–145)
TRIGL SERPL-MCNC: 90 MG/DL (ref 0–149)
TSH SERPL-ACNC: 0.8 MIU/L (ref 0.44–3.98)
VLDL: 18 MG/DL (ref 0–40)
WBC # BLD AUTO: 5 X10*3/UL (ref 4.4–11.3)

## 2024-10-23 PROCEDURE — 3074F SYST BP LT 130 MM HG: CPT | Performed by: INTERNAL MEDICINE

## 2024-10-23 PROCEDURE — G0439 PPPS, SUBSEQ VISIT: HCPCS | Performed by: INTERNAL MEDICINE

## 2024-10-23 PROCEDURE — 1159F MED LIST DOCD IN RCRD: CPT | Performed by: INTERNAL MEDICINE

## 2024-10-23 PROCEDURE — 80061 LIPID PANEL: CPT

## 2024-10-23 PROCEDURE — 1036F TOBACCO NON-USER: CPT | Performed by: INTERNAL MEDICINE

## 2024-10-23 PROCEDURE — 1170F FXNL STATUS ASSESSED: CPT | Performed by: INTERNAL MEDICINE

## 2024-10-23 PROCEDURE — 84443 ASSAY THYROID STIM HORMONE: CPT

## 2024-10-23 PROCEDURE — 85025 COMPLETE CBC W/AUTO DIFF WBC: CPT

## 2024-10-23 PROCEDURE — 36415 COLL VENOUS BLD VENIPUNCTURE: CPT

## 2024-10-23 PROCEDURE — 80053 COMPREHEN METABOLIC PANEL: CPT

## 2024-10-23 PROCEDURE — 99214 OFFICE O/P EST MOD 30 MIN: CPT | Performed by: INTERNAL MEDICINE

## 2024-10-23 PROCEDURE — 3078F DIAST BP <80 MM HG: CPT | Performed by: INTERNAL MEDICINE

## 2024-10-23 PROCEDURE — 1160F RVW MEDS BY RX/DR IN RCRD: CPT | Performed by: INTERNAL MEDICINE

## 2024-10-23 ASSESSMENT — ENCOUNTER SYMPTOMS
CONSTIPATION: 0
ROS SKIN COMMENTS: POSITIVE FOR SKIN LESION.
PALPITATIONS: 0
SHORTNESS OF BREATH: 0
DIARRHEA: 0
ABDOMINAL PAIN: 0

## 2024-10-23 ASSESSMENT — ACTIVITIES OF DAILY LIVING (ADL)
GROCERY_SHOPPING: INDEPENDENT
BATHING: INDEPENDENT
MANAGING_FINANCES: INDEPENDENT
TAKING_MEDICATION: INDEPENDENT
DRESSING: INDEPENDENT
DOING_HOUSEWORK: INDEPENDENT

## 2024-10-23 NOTE — PROGRESS NOTES
Patient here for a medicare wellness visit and follow up    Subjective   Patient ID: Ileana Edgar is a 84 y.o. female who presents for Follow-up and Medicare Annual Wellness Visit Subsequent.  She is accompanied by her  who offers some of the history.     The patient has been taking carvedilol 3.125mg twice daily and lisinopril 20mg once daily, and is tolerating the regimen well.  The blood pressure in the clinic today was 120/70 mmHg.  She states that previous palpitations have subsided.      The patient notes a new painless skin lesion with crusting on the top of her head since 8/2024.  Her  follows with  from Dermatology.    The patient denies any dyspnea, abdominal pain, or bowel problems.       Review of Systems   Respiratory:  Negative for shortness of breath.    Cardiovascular:  Negative for palpitations.   Gastrointestinal:  Negative for abdominal pain, constipation and diarrhea.   Skin:         Positive for skin lesion.        Objective   Physical Exam  Constitutional:       Appearance: Normal appearance.   Neck:      Vascular: No carotid bruit.   Cardiovascular:      Rate and Rhythm: Normal rate and regular rhythm.      Heart sounds: Normal heart sounds.   Pulmonary:      Effort: Pulmonary effort is normal.      Breath sounds: Normal breath sounds.   Abdominal:      General: Bowel sounds are normal.      Palpations: Abdomen is soft.      Tenderness: There is no abdominal tenderness.   Skin:     General: Skin is warm and dry.   Neurological:      General: No focal deficit present.      Mental Status: She is alert and oriented to person, place, and time. Mental status is at baseline.   Psychiatric:         Mood and Affect: Mood normal.         Behavior: Behavior normal.         Assessment/Plan   Problem List Items Addressed This Visit             ICD-10-CM    Hyperlipidemia - Primary E78.5    Relevant Orders    Lipid panel    CBC and Auto Differential    Comprehensive metabolic panel     Tsh With Reflex To Free T4 If Abnormal    Hypertension I10     Other Visit Diagnoses         Codes    Skin lesion     L98.9    Relevant Orders    Referral to Dermatology            Medicare Wellness Examination Done  -  Discussed healthy diet and regular exercise.    -  Physical exam overall unremarkable. Immunizations reviewed and updated accordingly. Healthy lifestyle choices discussed (tobacco avoidance, appropriate alcohol use, avoidance of illicit substances).   -  Patient is wearing seatbelt.   -  Screening lab work ordered as indicated.    -  Age appropriate screening tests reviewed with patient.       IMPRESSIONS/PLAN:    Skin Lesion  -Top of head.  Does look a bit concerning.  She thinks it has been present for 2 months or so.  - Ordered referral to Dermatology, and patient agreed to set up.    HTN   - /70 today in office.  Continue carvedilol 3.125mg BID and lisinopril 20mg QD.  Tolerating well.     HLD   - Stable, continue on simvastatin 40mg QD.     Palpitations  - She is NSR on exam today.   Previously started Carvedilol 3.125 mg BID.  This seems to be working very well.  No longer symptomatic.  BP well controlled too.  Home readings have been acceptable.  She had monitor in 2022 which showed brief runs of SVT and PVCs- no signs of Afib.  Echo 8/2024 showed normal LVSF with EF 65%.    Right Breast Cancer   - Diagnosed 2/2021 Stage 3A, s/p right mastectomy on 3/10/2021. Currently following Hematology/Oncology, previously Following with Dr. Laurent from Breast Surgery, maintained on anastrozole 1mg QD and Prolia injections.    Right Shoulder Pain  - Advised patient to try topical products containing lidocaine OTC.  Continue with stretching exercises.     Osteopenia   - Takes Calcium 600+D 600-400mg 2 tabs daily. She is also on Prolia.     Loose Stool  - Advised patient to increase dietary fiber.  Call the clinic if watery diarrhea recurs, and will order Stool Pathogen Panel PCR and other  testing.  Last colonoscopy 7/2022 showed large asymptomatic internal and external hemorrhoids.      Vitamin D Deficiency   - Takes Vitamin D3 50mcg QD.      Health Maintenance   - Routine labs ordered including CBC, CMP, and a lipid panel to be completed in the fasting state. Added TSH.  Last Mammogram 1/2024. Last colonoscopy 7/2022, no repeat due to age.       Follow up in 6 months, call sooner if needed.       Scribe Attestation  By signing my name below, I, Kanchan Gastelum   attest that this documentation has been prepared under the direction and in the presence of Enrrique Hernandez DO.   Kellee Zambrano 10/23/24 9:32 AM

## 2024-10-28 LAB
NON-UH HIE ALANINE AMINOTRANSFERASE: 13 U/L (ref 7–52)
NON-UH HIE ALBUMIN LEVEL: 4.4 G/DL (ref 3.5–5.7)
NON-UH HIE ALBUMIN/GLOBULIN RATIO: 1.8
NON-UH HIE ALKALINE PHOSPHATASE: 57 U/L (ref 34–104)
NON-UH HIE ANION GAP: 10.6 (ref 6–15)
NON-UH HIE ASPARTATE AMINO TRANSFERASE: 18 U/L (ref 13–39)
NON-UH HIE BILIRUBIN,TOTAL: 0.6 MG/DL (ref 0.3–1)
NON-UH HIE BLOOD UREA NITROGEN: 16 MG/DL (ref 7–25)
NON-UH HIE CALCIUM: 9.8 MG/DL (ref 8.6–10.3)
NON-UH HIE CARBON DIOXIDE: 32 MMOL/L (ref 21–31)
NON-UH HIE CHLORIDE: 103 MMOL/L (ref 98–107)
NON-UH HIE CREATININE: 0.76 MG/DL (ref 0.6–1.2)
NON-UH HIE ESTIMATED GFR: > 60
NON-UH HIE GLOBULIN: 2.4 G/DL
NON-UH HIE GLUCOSE: 98 MG/DL (ref 70–100)
NON-UH HIE POTASSIUM: 4.6 MMOL/L (ref 3.5–5.1)
NON-UH HIE SODIUM: 141 MMOL/L (ref 136–145)
NON-UH HIE TOTAL PROTEIN: 6.8 G/DL (ref 6.4–8.9)

## 2024-11-13 ENCOUNTER — APPOINTMENT (OUTPATIENT)
Dept: DERMATOLOGY | Facility: CLINIC | Age: 84
End: 2024-11-13
Payer: MEDICARE

## 2024-11-13 DIAGNOSIS — D48.5 NEOPLASM OF UNCERTAIN BEHAVIOR OF SKIN: Primary | ICD-10-CM

## 2024-11-13 PROCEDURE — 11102 TANGNTL BX SKIN SINGLE LES: CPT | Performed by: STUDENT IN AN ORGANIZED HEALTH CARE EDUCATION/TRAINING PROGRAM

## 2024-11-13 PROCEDURE — 1159F MED LIST DOCD IN RCRD: CPT | Performed by: DERMATOLOGY

## 2024-11-13 PROCEDURE — 99203 OFFICE O/P NEW LOW 30 MIN: CPT | Performed by: DERMATOLOGY

## 2024-11-13 ASSESSMENT — ITCH NUMERIC RATING SCALE: HOW SEVERE IS YOUR ITCHING?: 0

## 2024-11-13 NOTE — PROGRESS NOTES
Subjective     Ileana Edgar is a 84 y.o. female who presents for the following: Suspicious Skin Lesion (New  lesion on scalp 3-4 months-scab itches sometiimes , No hx of skin Ca ).     Skin Lesion  She describes it as a spot, that is located on her scalp.  It was first noticed 4 months ago. It has been causing itching and is not changing. Previously this spot has not been  treated .  Other spots that are concerning: None      Review of Systems:  No other skin or systemic complaints other than what is documented elsewhere in the note.    The following portions of the chart were reviewed this encounter and updated as appropriate:       Skin Cancer History  No skin cancer on file.    Specialty Problems    None    Past Medical History:  Ileana Edgar  has a past medical history of Abdominal distension (gaseous) (2017), Acute eczematoid otitis externa, unspecified ear (2021), Encounter for general adult medical examination without abnormal findings (2014), Encounter for screening mammogram for malignant neoplasm of breast (10/26/2015), Encounter for screening mammogram for malignant neoplasm of breast, Other conditions influencing health status (10/19/2017), Personal history of colonic polyps (2020), Personal history of other diseases of the digestive system (2018), Personal history of other diseases of the nervous system and sense organs (2013), and Personal history of other diseases of the nervous system and sense organs (2014).    Past Surgical History:  Ileana Edgar  has a past surgical history that includes Appendectomy (2012); Colonoscopy (2012);  section, classic (2012); Hysterectomy (2012); Esophagogastroduodenoscopy (2017); and Other surgical history (2014).    Family History:  Patient family history includes Colon cancer in her maternal grandmother and mother; parkinson disease in her father.       Objective   Well appearing  patient in no apparent distress; mood and affect are within normal limits.    A focused skin examination was performed. All findings within normal limits unless otherwise noted below.    Assessment/Plan   1. Neoplasm of uncertain behavior of skin  Scalp  2 x 1.5cm Scaly patch with areas of hyperkeratosis and brown-red crusting          Lesion biopsy  Type of biopsy: tangential    Informed consent: discussed and consent obtained    Timeout: patient name, date of birth, surgical site, and procedure verified    Procedure prep:  Patient was prepped and draped  Anesthesia: the lesion was anesthetized in a standard fashion    Anesthetic:  1% lidocaine w/ epinephrine 1-100,000 local infiltration  Instrument used: DermaBlade    Hemostasis achieved with: aluminum chloride    Outcome: patient tolerated procedure well    Post-procedure details: sterile dressing applied and wound care instructions given    Dressing type: petrolatum and bandage      Staff Communication: Dermatology Local Anesthesia: 1 % Lidocaine / Epinephrine - Amount:3ml    Specimen 1 - Dermatopathology- DERM LAB  Differential Diagnosis: r/o SCC  Check Margins Yes/No?:    Comments:    Dermpath Lab: Routine Histopathology (formalin-fixed tissue)    - pt ok with MyChart  - reviewed that if malignant will need Mohs    F/u pending biopsy results  Pt seen by Dr. Penny Luciano, PGY-2    I saw and evaluated the patient. I personally obtained the key and critical portions of the history and physical exam or was physically present for key and critical portions performed by the student/resident. I reviewed the student/resident's documentation and discussed the patient with the student/resident. I was present for the entirety of any procedure(s). I agree with the student/resident's medical decision making as documented in the note.

## 2024-11-15 LAB
LABORATORY COMMENT REPORT: NORMAL
PATH REPORT.FINAL DX SPEC: NORMAL
PATH REPORT.GROSS SPEC: NORMAL
PATH REPORT.MICROSCOPIC SPEC OTHER STN: NORMAL
PATH REPORT.RELEVANT HX SPEC: NORMAL
PATH REPORT.TOTAL CANCER: NORMAL

## 2024-11-18 DIAGNOSIS — D09.9 SQUAMOUS CELL CARCINOMA IN SITU (SCCIS): Primary | ICD-10-CM

## 2025-01-28 ENCOUNTER — TELEPHONE (OUTPATIENT)
Dept: PRIMARY CARE | Facility: CLINIC | Age: 85
End: 2025-01-28

## 2025-01-28 ENCOUNTER — APPOINTMENT (OUTPATIENT)
Dept: DERMATOLOGY | Facility: CLINIC | Age: 85
End: 2025-01-28
Payer: MEDICARE

## 2025-01-28 DIAGNOSIS — C44.92 SQUAMOUS CELL CARCINOMA OF SKIN: ICD-10-CM

## 2025-01-28 PROCEDURE — 99212 OFFICE O/P EST SF 10 MIN: CPT | Performed by: DERMATOLOGY

## 2025-01-28 NOTE — PROGRESS NOTES
Office Visit Note  Date: 1/28/2025  Surgeon:  Vivek Almeida MD PhD  Office Location: [address] [address]  Dept: [phone]  Dept Fax: [phone]  Referring Provider: Renee Jacques MD  [address] [place] [address]  [address], [address] [place] [address]    Elmer Edgar is a 84 y.o. female who presents for the following: MOHS Surgery (Scalp, SCC NO BP RIGHT ARM D/T MASTECTOMY/199/93 heart rate 67)    According to the patient, the lesion has been present for approximately 6 months at the time of diagnosis.  The lesion is not causing symptoms.  The lesion has not been treated previously.    The patient does not have a pacemaker / defibrillator.  The patient does not have a heart valve / joint replacement.    The patient is not on blood thinners.  The patient does not have a history of hepatitis B or C.  The patient does not have a history of HIV.  The patient does not have a history of immunosuppression (e.g. organ transplantation, malignancy, medications)  Did eat breakfast NO BP RIGHT ARM D/T MASTECTOMY    Review of Systems:  No other skin or systemic complaints other than what is documented elsewhere in the note.    MEDICAL HISTORY: clinically relevant history including significant past medical history, medications and allergies was reviewed and documented in Epic.    Objective   Well appearing patient in no apparent distress; mood and affect are within normal limits.  Vital signs: See record.  Noted on the Scalp  Is a 1.5 x 1.5 cm scar        The patient confirmed the identified site.    Discussion:  The nature of the diagnosis was explained. The lesion is a skin cancer.  It has a risk of local growth and distant spread. The condition is associated with sun exposure.  Warning signs of non-melanoma skin cancer discussed. Patient was instructed to perform monthly self skin examination.  We recommended that the patient have regular full skin  exams given an increased risk of subsequent skin cancers. The patient was instructed to use sun protective behaviors including use of broad spectrum sunscreens and sun protective clothing to reduce risk of skin cancers.      Risks, benefits, side effects of Mohs surgery were discussed with patient and the patient voiced understanding.  It was explained that even though the cure rate of Mohs is very high it is not 100%. Risks of surgery including but not limited to bleeding, infection, numbness, nerve damage, and scar were reviewed.  Discussion included wound care requirements, activity restrictions, likely scar outcome and time to heal.     After Mohs surgery, the defect may need to be repaired surgically and the scar may be longer than the original lesion.  Reconstruction options, risks, and benefits were reviewed including second intention healing, linear repair (4-1 ratio was explained), local flaps, skin grafts, cartilage grafts and interpolation flaps (the need for multiple surgeries was explained). Possible outcomes were reviewed including likely scar appearance, failure of flap survival, infection, bleeding and the need for revision surgery.     The pathology was reviewed, the photograph was reviewed, and the referring physician's note was reviewed.    Due to her blood pressure being 204/94, 184/92 then 182/100, she was referred to her PCP for evaluation. Her appointment was rescheduled for 2/19/25.    Vivek Almeida MD, PhD

## 2025-01-28 NOTE — TELEPHONE ENCOUNTER
Pt is having a skin procedure done at dermatologist. They were unable to perform surgery because pt had high blood pressure. Pt is rescheduled for surgery on 2/19. Dermatology recommend speaking to Dr. Hernandez to receive a medication to lower blood pressure

## 2025-02-04 ENCOUNTER — APPOINTMENT (OUTPATIENT)
Dept: PRIMARY CARE | Facility: CLINIC | Age: 85
End: 2025-02-04
Payer: MEDICARE

## 2025-02-04 VITALS
OXYGEN SATURATION: 97 % | DIASTOLIC BLOOD PRESSURE: 100 MMHG | WEIGHT: 128 LBS | SYSTOLIC BLOOD PRESSURE: 198 MMHG | BODY MASS INDEX: 23.41 KG/M2 | HEART RATE: 73 BPM | TEMPERATURE: 97.7 F | RESPIRATION RATE: 16 BRPM

## 2025-02-04 DIAGNOSIS — I10 PRIMARY HYPERTENSION: Primary | ICD-10-CM

## 2025-02-04 DIAGNOSIS — D04.4 SQUAMOUS CELL CARCINOMA IN SITU (SCCIS) OF SCALP: ICD-10-CM

## 2025-02-04 PROCEDURE — G2211 COMPLEX E/M VISIT ADD ON: HCPCS | Performed by: INTERNAL MEDICINE

## 2025-02-04 PROCEDURE — 1159F MED LIST DOCD IN RCRD: CPT | Performed by: INTERNAL MEDICINE

## 2025-02-04 PROCEDURE — 1160F RVW MEDS BY RX/DR IN RCRD: CPT | Performed by: INTERNAL MEDICINE

## 2025-02-04 PROCEDURE — 3080F DIAST BP >= 90 MM HG: CPT | Performed by: INTERNAL MEDICINE

## 2025-02-04 PROCEDURE — 3077F SYST BP >= 140 MM HG: CPT | Performed by: INTERNAL MEDICINE

## 2025-02-04 PROCEDURE — 99214 OFFICE O/P EST MOD 30 MIN: CPT | Performed by: INTERNAL MEDICINE

## 2025-02-04 PROCEDURE — 1036F TOBACCO NON-USER: CPT | Performed by: INTERNAL MEDICINE

## 2025-02-04 RX ORDER — LISINOPRIL 30 MG/1
30 TABLET ORAL DAILY
Qty: 100 TABLET | Refills: 3 | Status: SHIPPED | OUTPATIENT
Start: 2025-02-04 | End: 2026-03-11

## 2025-02-04 ASSESSMENT — ENCOUNTER SYMPTOMS: HYPERTENSION: 1

## 2025-02-04 NOTE — PROGRESS NOTES
Patient here for high blood pressure and recent derm appointment     Subjective   Patient ID: Ileana Edgar is a 84 y.o. female who presents for Follow-up and Hypertension.  Hypertension    The patient notes persistently elevated blood pressure with systolic readings in the 150-160s mmHg despite her best efforts.  She is currently maintained with carvedilol 3.125mg twice daily, and lisinopril 20mg once daily.  The blood pressure in the clinic today was 198/100 mmHg, and 150/90 mmHg on repeat.     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 The surgery was put on hold due to elevated blood pressure as discussed above.  She continues to follow with  from Dermatology.    Review of Systems   All other systems reviewed and are negative.      Objective   Physical Exam  Constitutional:       Appearance: Normal appearance.   Neck:      Vascular: No carotid bruit.   Cardiovascular:      Rate and Rhythm: Normal rate and regular rhythm.      Heart sounds: Normal heart sounds.   Pulmonary:      Effort: Pulmonary effort is normal.      Breath sounds: Normal breath sounds.   Abdominal:      General: Bowel sounds are normal.      Palpations: Abdomen is soft.      Tenderness: There is no abdominal tenderness.   Skin:     General: Skin is warm and dry.   Neurological:      General: No focal deficit present.      Mental Status: She is alert and oriented to person, place, and time. Mental status is at baseline.   Psychiatric:         Mood and Affect: Mood normal.         Behavior: Behavior normal.       Assessment/Plan   Problem List Items Addressed This Visit             ICD-10-CM    Hypertension - Primary I10    Relevant Medications    lisinopril 30 mg tablet    Other Relevant Orders    Referral to Clinical Pharmacy       IMPRESSIONS/PLAN:     Skin Lesion  -Confirmed SCC lesion on scalp.  Patient scheduled for MOHS  2/19/2025 pending improvement in blood pressure.  Following with  from Dermatology.     HTN  - /100 today in office, 150/90 on repeat.  Increase lisinopril to 30 once daily.  Continue carvedilol 3.125mg BID.  Advised patient to monitor blood pressure at home, and call the clinic if no improvement and will consider increasing lisinopril to 40mg once daily.  Ordered referral to Pharmacy.       Scribe Attestation  By signing my name below, I, Kanchan Gastelum   attest that this documentation has been prepared under the direction and in the presence of Enrrique Hernandez DO.   Kellee Zambrano 02/04/25 9:53 AM

## 2025-02-11 NOTE — PROGRESS NOTES
Hypertension/Blood Pressure Monitor Validation Initial Visit  Pharmacist Clinic: Hypertension Management    Ileana Edgar was referred to the Clinical Pharmacy Team for her blood pressure management.    Referring Provider: DO TOBIAS Rodas    Past Medical History:  She has a past medical history of Abdominal distension (gaseous) (2017), Acute eczematoid otitis externa, unspecified ear (2021), Encounter for general adult medical examination without abnormal findings (2014), Encounter for screening mammogram for malignant neoplasm of breast (10/26/2015), Encounter for screening mammogram for malignant neoplasm of breast, Other conditions influencing health status (10/19/2017), Personal history of colonic polyps (2020), Personal history of other diseases of the digestive system (2018), Personal history of other diseases of the nervous system and sense organs (2013), and Personal history of other diseases of the nervous system and sense organs (2014).    Past Surgical History:  She has a past surgical history that includes Appendectomy (2012); Colonoscopy (2012);  section, classic (2012); Hysterectomy (2012); Esophagogastroduodenoscopy (2017); and Other surgical history (2014).    Social History:  She reports that she has never smoked. She has never used smokeless tobacco. She reports that she does not drink alcohol and does not use drugs.    Family History:  Family History   Problem Relation Name Age of Onset    Colon cancer Mother      Other (parkinson disease) Father      Colon cancer Maternal Grandmother         Allergies:  Ciprofloxacin, Nitrofurantoin monohyd/m-cryst, and Sulfa (sulfonamide antibiotics)    HYPERTENSION ASSESSMENT  Medication Therapy  Current Medications:   Carvedilol 3.125 mg BID  Lisinopril 30 mg once daily (increased 25)    Previous Medications:   Lisinopril 20 mg once daily (dose adjustment)      Adverse Effects: denies     Home BP Monitoring  BP Cuff Type: Digital arm monitor - omron brand older than 4 years at least  Cuff Validated? No - will validate at today's encounter    Current home BP readings: provided by pt notes at today's encounter, generally will take BP at different times of day  Date      BP  Time    2/5 146/73  12:20 PM  179/86  3:46 PM  165/73  7:30 PM    2/6 143/78  8:43 AM  155/83  4:11 PM  160/79  7:44 PM    2/7 160/82  8:30 AM  135/74  11:43 AM  175/84  3:41 PM    2/8 138/78  7:32 AM  140/71  9:09 PM    2/9 155/77  7:43 AM  140/77  10:31 AM      Previous home BP readings: N/A    BP Readings from Last 3 Encounters:   02/12/25 (!) 225/94 02/04/25 (!) 198/100   10/23/24 120/70       Lifestyle  Diet: 3 meals/day.   Breakfast: oatmeal + a prune + a piece of dry toast  Lunch: turkey sandwich; egg; doshi lettuce sandwich; chocolate chip cookie or two  Dinner: steak with potatoes and salad; hamburger with fries; fish; chicken breast  Snacks: some potato chips but hasn't been snacking lately; popcorn with some salt  Drinks: water, quit coffee  Sodium Intake:  Adds to cooking  Physical Activity: used to mall walk but  hasn't been able to so they have not gone in a while    Risk Assessment  Major Risk Factors Include:  Hypertension  Dyslipidemia  Age > 55 (men) or > 65 (women)    The ASCVD Risk score (Frannie DK, et al., 2019) failed to calculate for the following reasons:    The 2019 ASCVD risk score is only valid for ages 40 to 79    Known target organ damage:  None    Primary Prevention  On Statin?: Yes, simvastatin 40 mg   On Aspirin?: No  Immunizations Needed: RSV  Tobacco Use: non-smoker     Objective     BP Readings from Last 6 Encounters:   02/12/25 (!) 225/94 02/04/25 (!) 198/100   10/23/24 120/70   08/23/24 134/72   08/06/24 128/70   07/30/24 158/90     Vitals:    02/12/25 1452   BP: (!) 225/94   BP Location: Left arm   Patient Position: Sitting   BP Cuff Size: Adult  "  Pulse: 76        Wt Readings from Last 6 Encounters:   02/04/25 58.1 kg (128 lb)   10/23/24 57.2 kg (126 lb)   08/23/24 56.7 kg (125 lb)   08/06/24 56.7 kg (125 lb)   07/30/24 55.8 kg (123 lb)   04/23/24 57.2 kg (126 lb)       RELEVANT LAB RESULTS  Lab Results   Component Value Date    BILITOT 0.8 10/23/2024    CALCIUM 9.4 10/23/2024    CO2 27 10/23/2024     10/23/2024    CREATININE 0.77 10/23/2024    GLUCOSE 102 (H) 10/23/2024    ALKPHOS 55 10/23/2024    K 4.0 10/23/2024    PROT 7.0 10/23/2024     10/23/2024    AST 19 10/23/2024    ALT 14 10/23/2024    BUN 16 10/23/2024    ANIONGAP 13 10/23/2024    MG 2.20 03/31/2022    ALBUMIN 4.2 10/23/2024    GFRF 74 10/27/2022     Lab Results   Component Value Date    TRIG 90 10/23/2024    CHOL 158 10/23/2024    LDLCALC 53 10/23/2024    HDL 87.5 10/23/2024     No results found for: \"HGBA1C\"  The ASCVD Risk score (Frannie NEGRO, et al., 2019) failed to calculate for the following reasons:    The 2019 ASCVD risk score is only valid for ages 40 to 79    DRUG INTERACTIONS  No significant drug-drug interactions exist that require change in therapy    DEVICE ACCURACY TEST   Care team should take five blood pressure readings using a combination of the patient's SMBP device and the office's method of blood pressure measurement.  STEP 1:              A Patient's Monitor left arm 212/96 mmHg HR 75   B Patient's Monitor left arm 216/100 mmHg HR 77   C Office's Monitor left arm 225/94 mmHg HR 76   D Patient's Monitor left arm 200/102 mmHg HR 77   E Office's Monitor N/A N/A N/A     STEP 2:  Part 1: Average measurements B and D 208/101  Part 2: Compare average of B and D to measurement C 17/7  Part 3: If the difference is …  --> Less than 5 mm Hg, this device can be used for SMBP  --> Between 6 and 10 mm Hg, proceed to Step 3  --> Greater than 10 mm Hg, replace the device before proceeding with your SMBP program    STEP 3:  Part 1: Average measurements C and E   Part 2: Compare " average of C and E to measurement D   Part 3: If the difference is …  --> Less than or equal to 10 mm Hg, this device can be used for SMBP  --> Greater than 10 mm Hg, replace the device before proceeding with your SMBP program    CONCLUSION: This BP monitor is not acceptable for home BP monitoring.    Assessment/Plan   Problem List Items Addressed This Visit       Hypertension     ASSESSMENT OF SMBP MEASUREMENTS  Patient's home BP variable and ranged from 170-130/70-80.  Patient's home BP monitor could not be validated and NOT acceptable for home BP monitoring.    Patient's goal BP < 140/80 - pt stated SBP <170 for surgical procedure  With unvalidated BP monitor, unable to fully assess patient's home blood pressures, but have remained >140/80 at home  Patient denied adherence issues or adverse drug reactions to recent dose increase of lisinopril 20 mg to 30 mg once daily.   Strongly suspect patient has white-coat hypertension - patient's BP continued to rise with successive BP measurements.     Rationale for plan:  Patient's home BP monitor could not be validated at this encounter - recommended pt to purchase new digital arm BP monitor and recommended Om[employer] Brand as it comes pre-validated and patient agreed.   Patient's blood pressure is still >140/80 and with in-office numbers of SBP in the 200s - recommended increase of lisinopril from 30 mg to 40 mg daily. Patient agreeable, discussed medication increase and what side effects to monitor for.   Discussed looking into alternatives to help calm her nervousness such as deep breathing or meditation - may have to readdress at follow up as patient did looked distracted and concerned with upcoming scheduled surgery and her high blood pressure.   Will follow up in 2 weeks to assess medication tolerance and patient is doing well with dose increase and not experiencing low blood pressures  With recent dose changes in lisinopril patient will need to have BMP completed before  next monthly follow up for hypertension management - will discuss with patient in 2 weeks and will order lab to be completed before next follow up.     Medication Changes:  Continue  Carvedilol 3.125 mg BID  STOP  Lisinopril 30 mg daily  START  Lisinopril 40 mg daily         Relevant Medications    lisinopril 40 mg tablet    Other Relevant Orders    Referral to Clinical Pharmacy     Follow Up:  02/07/25    PCP Follow Up:  04/22/25    Bigg Kessler PharmD    Continue all meds under the continuation of care with the referring provider and clinical pharmacy team.

## 2025-02-12 ENCOUNTER — PHARMACY VISIT (OUTPATIENT)
Dept: PHARMACY | Facility: CLINIC | Age: 85
End: 2025-02-12
Payer: COMMERCIAL

## 2025-02-12 ENCOUNTER — APPOINTMENT (OUTPATIENT)
Dept: PRIMARY CARE | Facility: CLINIC | Age: 85
End: 2025-02-12
Payer: MEDICARE

## 2025-02-12 VITALS — DIASTOLIC BLOOD PRESSURE: 94 MMHG | SYSTOLIC BLOOD PRESSURE: 225 MMHG | HEART RATE: 76 BPM

## 2025-02-12 DIAGNOSIS — I10 PRIMARY HYPERTENSION: ICD-10-CM

## 2025-02-12 PROCEDURE — RXOTC WILLOW AMBULATORY OTC CHARGE

## 2025-02-12 PROCEDURE — RXMED WILLOW AMBULATORY MEDICATION CHARGE

## 2025-02-12 RX ORDER — LISINOPRIL 40 MG/1
40 TABLET ORAL DAILY
Qty: 30 TABLET | Refills: 1 | Status: SHIPPED | OUTPATIENT
Start: 2025-02-12

## 2025-02-13 NOTE — ASSESSMENT & PLAN NOTE
ASSESSMENT OF SMBP MEASUREMENTS  Patient's home BP variable and ranged from 170-130/70-80.  Patient's home BP monitor could not be validated and NOT acceptable for home BP monitoring.    Patient's goal BP < 140/80 - pt stated SBP <170 for surgical procedure  With unvalidated BP monitor, unable to fully assess patient's home blood pressures, but have remained >140/80 at home  Patient denied adherence issues or adverse drug reactions to recent dose increase of lisinopril 20 mg to 30 mg once daily.   Strongly suspect patient has white-coat hypertension - patient's BP continued to rise with successive BP measurements.     Rationale for plan:  Patient's home BP monitor could not be validated at this encounter - recommended pt to purchase new digital arm BP monitor and recommended [employer] as it comes pre-validated and patient agreed.   Patient's blood pressure is still >140/80 and with in-office numbers of SBP in the 200s - recommended increase of lisinopril from 30 mg to 40 mg daily. Patient agreeable, discussed medication increase and what side effects to monitor for.   Discussed looking into alternatives to help calm her nervousness such as deep breathing or meditation - may have to readdress at follow up as patient did looked distracted and concerned with upcoming scheduled surgery and her high blood pressure.   Will follow up in 2 weeks to assess medication tolerance and patient is doing well with dose increase and not experiencing low blood pressures  With recent dose changes in lisinopril patient will need to have BMP completed before next monthly follow up for hypertension management - will discuss with patient in 2 weeks and will order lab to be completed before next follow up.     Medication Changes:  Continue  Carvedilol 3.125 mg BID  STOP  Lisinopril 30 mg daily  START  Lisinopril 40 mg daily

## 2025-02-19 ENCOUNTER — APPOINTMENT (OUTPATIENT)
Dept: DERMATOLOGY | Facility: CLINIC | Age: 85
End: 2025-02-19
Payer: MEDICARE

## 2025-02-19 DIAGNOSIS — C44.92 SQUAMOUS CELL CARCINOMA OF SKIN: ICD-10-CM

## 2025-02-19 PROCEDURE — 17312 MOHS ADDL STAGE: CPT | Performed by: DERMATOLOGY

## 2025-02-19 PROCEDURE — 13121 CMPLX RPR S/A/L 2.6-7.5 CM: CPT | Performed by: DERMATOLOGY

## 2025-02-19 PROCEDURE — 17311 MOHS 1 STAGE H/N/HF/G: CPT | Performed by: DERMATOLOGY

## 2025-02-19 NOTE — PROGRESS NOTES
Mohs Surgery Operative Note    +++ The patient's bp remains elevated. Her primary team is aware.  +++ Discussed risks of bleeding with bps elevated to this level and the patient selects to proceed today with understanding of the risk.    Date of Surgery:  2/19/2025  Surgeon:  Vivek Almeida MD PhD  Office Location: [address] [address]  Dept: [phone]  Dept Fax: [phone]  Referring Provider: Renee Jacques MD  [address] [place] [address] B, [address] [address]      Assessment/Plan   Pre-procedure:   Obtained informed consent: written from patient  The surgical site was identified and confirmed with the patient.     Intra-operative:   Audible time out called at : 2:31 PM 02/19/25  by: Juan Israel MA   Verified patient name, birthdate, site, specimen bottle label & requisition.    The planned procedure(s) was again reviewed with the patient. The risks of bleeding, infection, nerve damage and scarring were reviewed. Written authorization was obtained. The patient identity, surgical site, and planned procedure(s) were verified. The provider acted as both surgeon and pathologist.     Squamous cell carcinoma of skin  Scalp    Mohs surgery    Consent obtained: written    Universal Protocol:  Procedure explained and questions answered to patient or proxy's satisfaction: Yes    Test results available and properly labeled: Yes    Pathology report reviewed: Yes    External notes reviewed: Yes    Photo or diagram used for site identification: Yes    Site/side marked: Yes    Slide independently reviewed by Mohs surgeon: Yes    Immediately prior to procedure a time out was called: Yes    Patient identity confirmed: verbally with patient  Preparation: Patient was prepped and draped in usual sterile fashion      Anticoagulation:  Is the patient taking prescription anticoagulant and/or aspirin prescribed/recommended by a physician? Yes    Was the  anticoagulation regimen changed prior to Mohs? No      Anesthesia:  Anesthesia method: local infiltration  Local anesthetic: lidocaine 1% WITH epi    Procedure Details:  Case ID Number: [id number]  Biopsy accession number: [id number]  Date of biopsy: 11/13/2024  Frozen section biopsy performed: No    Specimen debulked: Yes (Scab)    Pre-Op diagnosis: squamous cell carcinoma  SCC subtype: in situ  Surgical site (from skin exam): Scalp  Pre-operative length (cm): 2.5  Pre-operative width (cm): 1.5  Indications for Mohs surgery: anatomic location where tissue conservation is critical  Previously treated? No      Micrographic Surgery Details:  Post-operative length (cm): 3  Post-operative width (cm): 1.8  Number of Mohs stages: 2    Stage 1     Comments: The patient was brought into the operating room and placed in the procedure chair in the appropriate position.  The area positive by previous biopsy was identified and confirmed with the patient. The area of clinically obvious tumor was debulked using a curette and/or scalpel as needed. An incision was made following the Mohs approach through the skin. The specimen was taken to the lab, divided into 2 piece(s) and appropriately chromacoded and processed.      .  Tumor was seen on the lateral margins as indicated on the on the Mohs map.  Squamous cell carcinoma in situ. Histologic examination revealed enlarged, atypical keratinocytes with large nuclear to cytoplasmic ratio extending throughout the full thickness of an epidermis.            Tumor features identified on Mohs section: squamous cell carcinoma    Stage 2     Comments: The area of positivity as noted on the Mohs map in the previous stage was identified and removed using the Mohs technique. The specimen was taken to the lab and appropriately chromacoded and processed in 1 piece(s).               Tumor features identified on Mohs section: no tumor identified    Depth of defect: subcutaneous fat    Patient tolerance  of procedure: tolerated well, no immediate complications    Reconstruction:  Was the defect reconstructed? Yes    Was reconstruction performed by the same Mohs surgeon? Yes    Setting of reconstruction: outpatient office  When was reconstruction performed? same day  Type of reconstruction: linear  Linear reconstruction: complex  Length of linear repair (cm): 4  Subcutaneous Layers (Deep Stitches)   Suture size:  3-0  Suture type:  Vicryl  Stitches:  Buried vertical mattress  Fine/surface layer approximation (top stitches)   Fine approximation suture size: staples.  Stitches: simple interrupted    Hemostasis achieved with: electrodesiccation  Outcome: patient tolerated procedure well with no complications    Post-procedure details: sterile dressing applied and wound care instructions given    Dressing type: pressure dressing          Complex Linear Repair - Wide Undermining:  Given the location and size of the defect, it was determined that a complex layered linear closure was required to restore normal anatomy and function. The repair was considered complex because extensive undermining was required and performed. The amount of undermining performed was greater than the maximum width of the defect as measured perpendicular to the closure line along at least one entire edge of the defect. Standing cutaneous cones were removed using Burow's triangles. The wound edges were brought into close approximation with buried vertical mattress sutures. The remainder of the wound was then closed with epidermal top sutures.        The final repair measured 4.0 cm    Wound care was discussed, and the patient was given written post-operative wound care instructions.      The patient will follow up with Vivek Almeida MD PhD as needed for any post operative problems or concerns, and will follow up with their primary dermatologist as scheduled.

## 2025-02-27 ENCOUNTER — APPOINTMENT (OUTPATIENT)
Dept: PHARMACY | Facility: HOSPITAL | Age: 85
End: 2025-02-27
Payer: MEDICARE

## 2025-02-27 DIAGNOSIS — I10 PRIMARY HYPERTENSION: ICD-10-CM

## 2025-02-27 NOTE — ASSESSMENT & PLAN NOTE
ASSESSMENT OF SMBP MEASUREMENTS  Highest readin/95 mmHg  Lowest readin/70  mmHg  Average: 161/83  mmHg    Patient's goal BP < 140/80  Based on patient's average home blood pressure, patient is NOT at goal - blood pressures are variable through out the day.  Started on increased dose of lisinopril 40 mg once daily (25)  Patient has been dealing with a lot of stress related to her health concerns as well as spouse's health issues, also reports get very nervous about her high blood pressure readings.  Denied s/sx of severe high blood pressure such as SOB, chest pain, severe HA, or N/V    Rationale for plan:   Due to patient's recent dose increase of lisinopril 40 mg and has not been on new dose for an adequate period as well as BP seems to have slight downward trend, will continue to monitor home blood pressures closely at this time and not make any medication changes  Instructed patient to continue to monitor blood pressure regularly at home  Encourage to patient to contact clinical pharmacist if any issues or concerns should arise prior to follow up  Will follow up in 2 weeks to reassess home blood pressures and if medication adjustments are warranted  BMP to monitor for lisinopril dose increase    Medication Changes:  CONTINUE  Carvedilol 3.125 mg BID  Lisinopril 40 mg daily    Future Considerations:  Next steps to consider if blood pressure not adequately controlled - increase carvedilol dose (avg pulse 76 bpm)    Monitoring and Education Discussed:  Calming exercises: Deep breathing and meditation can be very helpful for blood pressure regulation. Managing stress is very helpful for controlling blood pressure over time. You can find instructional videos that walk you through the steps easily on Youtube.   Monitor your blood pressure: You should check your blood pressure regularly. Notify our clinic if your blood pressure readings are consistently higher than recommended.

## 2025-02-27 NOTE — PROGRESS NOTES
.  Clinical Pharmacy Appointment    Patient ID: Ileana Edgar is a 84 y.o. female who presents for HTN management.    Pt is here for Follow Up appointment.     Referring Provider: Enrrique Hernandez DO  PCP: Enrrique Hernandez DO   Last visit with PCP: 2/4/2025   Next visit with PCP: 4/22/2025    Subjective     Interval History  Met with patient 2/12 to validate home BP monitor   Patient had to purchase a new monitor as her old monitor could not be validated - recommended [employer]  Increased lisinopril to 40 mg daily to reach goals for upcoming surgery (completed on 2/19)    HPI  HYPERTENSION ASSESSMENT  Medication Therapy  Current Medications:   Lisinopril 40 mg daily   Expressed concerns that her BP has been higher since dose increase, discussed with blood pressure medications, significant changes to blood pressure usually takes 4-6 weeks, additionally patient has been dealing with health concerns as well as her spouse's health concerns   Carvedilol 3.125 mg BID  Adherence: Reports good adherence   Adverse effects: None      Previous Medications:   Lisinopril 30 mg daily    Home BP Monitoring  BP Cuff Type: Digital arm monitor  Cuff Validated? Yes, [employer] brand    Current home BP readings:    DATE TIME BP PULSE   2/17 /81 N/A    /93 N/A   2/18 /89 N/A    /93 N/A   2/19 /81 86    /82 81   2/20 Noon 156/73 70    /77 72   2/21 /76 70    /82 72   2/22 /85 N/A    /70 80   2/23 /81 72    /80 78   2/24 AM  163/80 75    /90 74   2/25 /90 71    /93 74   2/26 /70 80    /76 79   2/27 /92 73    Noon 202/95 N/A     Average BP: 161/83  Average Pulse: 76    Previous home BP readings: N/A due to invalid home BP monitor at last visit     BP Readings from Last 5 Encounters:   02/12/25 (!) 225/94   02/04/25 (!) 198/100   10/23/24 120/70   08/23/24 134/72   08/06/24 128/70       Lifestyle  Diet: 3 meals/day.   Breakfast:  oatmeal + a prune + a piece of dry toast  Lunch: turkey sandwich; egg; doshi lettuce sandwich; chocolate chip cookie or two  Dinner: steak with potatoes and salad; hamburger with fries; fish; chicken breast  Snacks: some potato chips but hasn't been snacking lately; popcorn with some salt  Drinks: water, quit coffee  Sodium Intake:  Adds to cooking  Physical Activity: used to mall walk but  hasn't been able to so they have not gone in a while    Risk Assessment  Major Risk Factors Include:  Hypertension  Dyslipidemia  Age > 55 (men) or > 65 (women)    The ASCVD Risk score (Frannie NEGRO, et al., 2019) failed to calculate for the following reasons:    The 2019 ASCVD risk score is only valid for ages 40 to 79    Known target organ damage:  None    Primary Prevention  On Statin?: Yes, simvastatin 40 mg   On Aspirin?: No  Immunizations Needed: RSV  Tobacco Use: non-smoker     Medication Reconciliation:  Changed: N/A  Added: N/A  Discontinued: N/A    Drug Interactions  No relevant drug interactions were noted.    Medication System Management  Patient's preferred pharmacy: [facility] [place]  [phone]  Adherence/Organization: No issues  Affordability/Accessibility: No concerns       Objective   Allergies   Allergen Reactions    Ciprofloxacin Unknown     Hives, could not walk    Nitrofurantoin Monohyd/M-Cryst Fever    Sulfa (Sulfonamide Antibiotics) Hives     Social History     Social History Narrative    Not on file      Medication Review  Current Outpatient Medications   Medication Instructions    acetaminophen (Tylenol) 325 mg tablet 3 times daily PRN    anastrozole (Arimidex) 1 mg tablet 1 tablet, Daily    calcium carbonate-vit D3-min 600 mg calcium- 400 unit tablet 2 tablets, Daily    carvedilol (COREG) 3.125 mg, oral, 2 times daily    lisinopril 40 mg, oral, Daily    multivitamin tablet 1 tablet, Daily    simvastatin (ZOCOR) 40 mg, oral, Nightly      Vitals  BP Readings from Last 2 Encounters:  "  25 (!) 225/94   25 (!) 198/100     BMI Readings from Last 1 Encounters:   25 23.41 kg/m²      Labs  A1C  No results found for: \"HGBA1C\"  BMP  Lab Results   Component Value Date    CALCIUM 9.4 10/23/2024     10/23/2024    K 4.0 10/23/2024    CO2 27 10/23/2024     10/23/2024    BUN 16 10/23/2024    CREATININE 0.77 10/23/2024    EGFR 76 10/23/2024     LFTs  Lab Results   Component Value Date    ALT 14 10/23/2024    AST 19 10/23/2024    ALKPHOS 55 10/23/2024    BILITOT 0.8 10/23/2024     FLP  Lab Results   Component Value Date    TRIG 90 10/23/2024    CHOL 158 10/23/2024    LDLF 59 10/27/2022    LDLCALC 53 10/23/2024    HDL 87.5 10/23/2024     Urine Microalbumin  Lab Results   Component Value Date    MICROALBCREA 21.0 10/19/2017     Weight Management  Wt Readings from Last 3 Encounters:   25 58.1 kg (128 lb)   10/23/24 57.2 kg (126 lb)   24 56.7 kg (125 lb)        Assessment/Plan   Problem List Items Addressed This Visit       Hypertension     ASSESSMENT OF SMBP MEASUREMENTS  Highest readin/95 mmHg  Lowest readin/70  mmHg  Average: 161/83  mmHg    Patient's goal BP < 140/80  Based on patient's average home blood pressure, patient is NOT at goal - blood pressures are variable through out the day.  Started on increased dose of lisinopril 40 mg once daily (25)  Patient has been dealing with a lot of stress related to her health concerns as well as spouse's health issues, also reports get very nervous about her high blood pressure readings.  Denied s/sx of severe high blood pressure such as SOB, chest pain, severe HA, or N/V    Rationale for plan:   Due to patient's recent dose increase of lisinopril 40 mg and has not been on new dose for an adequate period as well as BP seems to have slight downward trend, will continue to monitor home blood pressures closely at this time and not make any medication changes  Instructed patient to continue to monitor blood " pressure regularly at home  Encourage to patient to contact clinical pharmacist if any issues or concerns should arise prior to follow up  Will follow up in 2 weeks to reassess home blood pressures and if medication adjustments are warranted  BMP to monitor for lisinopril dose increase    Medication Changes:  CONTINUE  Carvedilol 3.125 mg BID  Lisinopril 40 mg daily    Future Considerations:  Next steps to consider if blood pressure not adequately controlled - increase carvedilol dose (avg pulse 76 bpm)    Monitoring and Education Discussed:  Calming exercises: Deep breathing and meditation can be very helpful for blood pressure regulation. Managing stress is very helpful for controlling blood pressure over time. You can find instructional videos that walk you through the steps easily on Youtube.   Monitor your blood pressure: You should check your blood pressure regularly. Notify our clinic if your blood pressure readings are consistently higher than recommended.          Relevant Orders    Referral to Clinical Pharmacy    Basic metabolic panel     Clinical Pharmacist follow-up: 03/13/25, Telehealth visit  Continue all meds under the continuation of care with the referring provider and clinical pharmacy team.    Thank you,  Veronica Hernández  Pharmacy Student    Reena Kessler (Suji), PharmD  PGY-1  Meds Pharmacy Resident     Verbal consent to manage patient's drug therapy was obtained from the patient. They were informed they may decline to participate or withdraw from participation in pharmacy services at any time.

## 2025-03-11 ENCOUNTER — APPOINTMENT (OUTPATIENT)
Dept: DERMATOLOGY | Facility: CLINIC | Age: 85
End: 2025-03-11
Payer: MEDICARE

## 2025-03-11 DIAGNOSIS — Z48.02 VISIT FOR SUTURE REMOVAL: ICD-10-CM

## 2025-03-11 PROCEDURE — 99024 POSTOP FOLLOW-UP VISIT: CPT | Performed by: DERMATOLOGY

## 2025-03-11 NOTE — PROGRESS NOTES
Office Follow Up Note    Visit Summary  Chief Complaint    1. Complaint Wound check.    Ileana Edgar is a 84 y.o. female who presents for 4 week follow up after surgery for a squamous cell carcinoma. The patient has no concerns today.     Location Operation site location: scalp    On exam,  Ms. Edgar is well-appearing and in no apparent distress. The surgical site appears clean with minimal to no erythema. No tenderness and good wound edge apposition.        Assessment and Plan:  The patient was reassured that the wound is healing appropriately.   Staples were removed without complication today.  The dressing was removed, the wound cleaned and a new dressing reapplied.     The patient was instructed to call with any further concerns. The patient will return as needed.

## 2025-03-12 NOTE — PROGRESS NOTES
.  Clinical Pharmacy Appointment    Patient ID: Ileana Edgar is a 84 y.o. female who presents for blood pressure management.    Pt is here for Follow Up appointment.     Referring Provider: Enrrique Hernandez DO  PCP: Enrrique Hernandez DO   Last visit with PCP: 2/4/2025   Next visit with PCP: 4/22/2025    Subjective     Interval History  Patient referred to clinical pharmacy for BPM validation and blood pressure management. Had elevated blood pressure readings, was not able to validate home BP monitor and recommended to purchase new blood pressure monitor. Patient also had a surgical procedure that had been delayed due to BP >160/90. Patient's BP continued to increase in-office due to concerns of high blood pressure and potential delay in surgical procedure again. Increased lisinopril to 40 mg daily in effort to reach goals for upcoming surgery (completed on 2/19).     At last clinical pharmacy encounter 02/27/25 - patient's BP still within HTN Stage 2 range, however BP was variable but overall had a down trend. Planned to continue to monitor BP closely at home. Considering increasing carvedilol if patient's BP is still consistently above goal. Average pulse 76 bpm (02/27/25).       HPI  HYPERTENSION ASSESSMENT  Medication Therapy  Current Medications:   Lisinopril 40 mg daily   BMP to completed before next follow up with Dr. Hernandez  Carvedilol 3.125 mg BID  Adherence: Denies, uses pill box  Adverse effects: Denies, but does report some fatigue, but could be related to other issues, not really sure.     Previous Medications:   Lisinopril 30 mg daily (dose adjustment)    Home BP Monitoring  BP Cuff Type: Digital arm monitor - [employer] purchased 02/12/25  Cuff Validated? Yes, comes pre-validated    Current home BP readings:   DATE  SBP DBP HR Comments  02/28/25 160 74 73 AM after meds    163 78 69 Evening             03/01/25 160 88 70 AM after meds    155 76 77 Afternoon    139 69 78 Evening              03/02/25 164 81 65 AM after meds    154 77 73 Afternoon    143 79 77 Evening             03/03/25 142 72 72 AM after meds    140 71 73 Afternoon          03/04/25 166 80 73 AM after meds    144 75 69 Afternoon    161 77 69 Evening             03/05/25 134 73 67 AM after meds    155 79 67 Afternoon    155 84 70 Evening               03/06/25 148 73 75 AM after meds    150 70 71 Afternoon    180 81 71 Stressed - evening  03/07/25 138 72 76 AM after meds    156 77 73 Afternoon    142 70 77 Evening               03/08/25 146 74 67 AM after meds    145 70 68 Afternoon    156 76 73 Evening              03/09/25 147 73 66 AM after meds    173 79 63 Evening              03/10/25 164 75 71 AM after meds    146 74 69 Afternoon    156 78 69 Evening              03/11/25 160 77 77 AM after meds    155 74 69 Afternoon    149 79 69 Evening              03/12/25 146 73 67 AM after meds    139 77 73 Afternoon    158 76 68 Evening             03/13/25 141 78 73 AM after meds    138 66 67 Afternoon         Average BP: 152/76 mmHg  Average HR: 71 bpm    Previous home BP readings:   02/27/25:  DATE TIME BP PULSE   2/17 /81 N/A    /93 N/A   2/18 /89 N/A    /93 N/A   2/19 /81 86    /82 81   2/20 Noon 156/73 70    /77 72   2/21 /76 70    /82 72   2/22 /85 N/A    /70 80   2/23 /81 72    /80 78   2/24 AM  163/80 75    /90 74   2/25 /90 71    /93 74   2/26 /70 80    /76 79   2/27 /92 73    Noon 202/95 N/A   Average BP: 161/83  Average Pulse: 76      BP Readings from Last 5 Encounters:   02/12/25 (!) 225/94   02/04/25 (!) 198/100   10/23/24 120/70   08/23/24 134/72   08/06/24 128/70       Lifestyle  Diet: 3 meals/day. - no new changes  Breakfast: oatmeal + a prune + a piece of dry toast  Lunch: turkey sandwich; egg; doshi lettuce sandwich; chocolate chip cookie or two  Dinner: steak with potatoes and salad; hamburger with fries;  "fish; chicken breast  Snacks: some potato chips but hasn't been snacking lately; popcorn with some salt  Drinks: water, quit coffee  Sodium Intake:  Adds to cooking  Physical Activity: used to mall walk but  hasn't been able to so they have not gone in a while    Risk Assessment  Major Risk Factors Include:  Hypertension  Dyslipidemia  Age > 55 (men) or > 65 (women)    The ASCVD Risk score (Frannie NEGRO, et al., 2019) failed to calculate for the following reasons:    The 2019 ASCVD risk score is only valid for ages 40 to 79    Known target organ damage:  None    Primary Prevention  On Statin?: Yes, simvastatin 40 mg   On Aspirin?: No  Immunizations Needed: RSV  Tobacco Use: non-smoker     Medication Reconciliation:  Changed: N/A  Added: N/A  Discontinued: N/A    Drug Interactions  No relevant drug interactions were noted.    Medication System Management  Patient's preferred pharmacy: [facility] [place] - [phone]  Adherence/Organization: No issues  Affordability/Accessibility: No concerns       Objective   Allergies   Allergen Reactions    Ciprofloxacin Unknown     Hives, could not walk    Nitrofurantoin Monohyd/M-Cryst Fever    Sulfa (Sulfonamide Antibiotics) Hives     Social History     Social History Narrative    Not on file      Medication Review  Current Outpatient Medications   Medication Instructions    acetaminophen (Tylenol) 325 mg tablet 3 times daily PRN    anastrozole (Arimidex) 1 mg tablet 1 tablet, Daily    calcium carbonate-vit D3-min 600 mg calcium- 400 unit tablet 2 tablets, Daily    carvedilol (COREG) 6.25 mg, oral, 2 times daily    lisinopril 40 mg, oral, Daily    multivitamin tablet 1 tablet, Daily    simvastatin (ZOCOR) 40 mg, oral, Nightly      Vitals  BP Readings from Last 2 Encounters:   02/12/25 (!) 225/94   02/04/25 (!) 198/100     BMI Readings from Last 1 Encounters:   02/04/25 23.41 kg/m²      Labs  A1C  No results found for: \"HGBA1C\"  BMP  Lab Results   Component Value " Date    CALCIUM 9.4 10/23/2024     10/23/2024    K 4.0 10/23/2024    CO2 27 10/23/2024     10/23/2024    BUN 16 10/23/2024    CREATININE 0.77 10/23/2024    EGFR 76 10/23/2024     LFTs  Lab Results   Component Value Date    ALT 14 10/23/2024    AST 19 10/23/2024    ALKPHOS 55 10/23/2024    BILITOT 0.8 10/23/2024     FLP  Lab Results   Component Value Date    TRIG 90 10/23/2024    CHOL 158 10/23/2024    LDLF 59 10/27/2022    LDLCALC 53 10/23/2024    HDL 87.5 10/23/2024     Urine Microalbumin  Lab Results   Component Value Date    MICROALBCREA 21.0 10/19/2017     Weight Management  Wt Readings from Last 3 Encounters:   25 58.1 kg (128 lb)   10/23/24 57.2 kg (126 lb)   24 56.7 kg (125 lb)        Assessment/Plan   Problem List Items Addressed This Visit       Hypertension - Primary     ASSESSMENT OF SMBP MEASUREMENTS  Highest readin/81 mmHg  Lowest readin/73  mmHg  Average: 152/76  mmHg    Patient's goal BP < 140/80  Based on average home BP, patient is not a goal - still considered HTN Stage 2 and patient's BP has been more consistently above goal.  Patient denied any issues with adherence or adverse drug reactions - had some fatigue, but patient was not sure if truly related to her blood pressure medications.  Denied lightheadedness, dizziness, chest pain, SOB, severe headache or palpitations.     Rationale for plan:   Due to patient being consistently above goal despite being on dose increase of lisinopril for a month, will increase carvedilol from 3.125 mg to 6.25 mg twice daily. Patient agreeable.   Discussed potential side effects such as feeling dizzy or tired/weak and counseled to continue to monitor her blood pressure and heart rate regularly. Counseled to take medications at the same time as much as possible with food.   Reminded patient to call clinical pharmacist if issues or concerns should arise prior to next follow up and provided direct contact information.   Will  follow up in 2 weeks to assess tolerability to dose increase in carvedilol.   Patient has follow up with Dr. Hernandez 04/22/25 - usually gets blood work done after visit  BMP due for safety monitoring for dose increase    Medication Changes:  CONTINUE  Lisinopril 40 mg once daily  INCREASE  Carvedilol 6.25 mg twice daily with food (increased from 3.125 mg)  Patient will take 2 tablets (total 6.25 mg) twice daily until able to  new prescription for Carvedilol 6.25 mg         Relevant Medications    carvedilol (Coreg) 6.25 mg tablet    Other Relevant Orders    Referral to Clinical Pharmacy   Clinical Pharmacist follow-up: 03/13/25, Telehealth visit  Continue all meds under the continuation of care with the referring provider and clinical pharmacy team.    Reena Kessler (Suji), PharmD  PGY-1  Meds Pharmacy Resident     Verbal consent to manage patient's drug therapy was obtained from the patient. They were informed they may decline to participate or withdraw from participation in pharmacy services at any time.

## 2025-03-13 ENCOUNTER — APPOINTMENT (OUTPATIENT)
Dept: PHARMACY | Facility: HOSPITAL | Age: 85
End: 2025-03-13
Payer: MEDICARE

## 2025-03-13 DIAGNOSIS — I10 PRIMARY HYPERTENSION: Primary | ICD-10-CM

## 2025-03-13 RX ORDER — CARVEDILOL 6.25 MG/1
6.25 TABLET ORAL 2 TIMES DAILY
Qty: 60 TABLET | Refills: 1 | Status: SHIPPED | OUTPATIENT
Start: 2025-03-13

## 2025-03-13 NOTE — ASSESSMENT & PLAN NOTE
ASSESSMENT OF SMBP MEASUREMENTS  Highest readin/81 mmHg  Lowest readin/73  mmHg  Average: 152/76  mmHg    Patient's goal BP < 140/80  Based on average home BP, patient is not a goal - still considered HTN Stage 2 and patient's BP has been more consistently above goal.  Patient denied any issues with adherence or adverse drug reactions - had some fatigue, but patient was not sure if truly related to her blood pressure medications.  Denied lightheadedness, dizziness, chest pain, SOB, severe headache or palpitations.     Rationale for plan:   Due to patient being consistently above goal despite being on dose increase of lisinopril for a month, will increase carvedilol from 3.125 mg to 6.25 mg twice daily. Patient agreeable.   Discussed potential side effects such as feeling dizzy or tired/weak and counseled to continue to monitor her blood pressure and heart rate regularly. Counseled to take medications at the same time as much as possible with food.   Reminded patient to call clinical pharmacist if issues or concerns should arise prior to next follow up and provided direct contact information.   Will follow up in 2 weeks to assess tolerability to dose increase in carvedilol.   Patient has follow up with Dr. Hernandez 25 - usually gets blood work done after visit  BMP due for safety monitoring for dose increase    Medication Changes:  CONTINUE  Lisinopril 40 mg once daily  INCREASE  Carvedilol 6.25 mg twice daily with food (increased from 3.125 mg)  Patient will take 2 tablets (total 6.25 mg) twice daily until able to  new prescription for Carvedilol 6.25 mg

## 2025-03-18 ENCOUNTER — APPOINTMENT (OUTPATIENT)
Dept: DERMATOLOGY | Facility: CLINIC | Age: 85
End: 2025-03-18
Payer: MEDICARE

## 2025-03-21 ENCOUNTER — TELEPHONE (OUTPATIENT)
Dept: PHARMACY | Facility: HOSPITAL | Age: 85
End: 2025-03-21
Payer: MEDICARE

## 2025-03-21 NOTE — TELEPHONE ENCOUNTER
Patient called with concerns for elevated blood pressure. Reported last few days her SBP has been >160-170s in the AM. When a second reading 1-2 hours later was performed, reported SBP would be lower ~150s. Patient denied any current s/sx of severe hypertension such as severe headache, acute changes in vision, SOB, or chest pain.     After discussing with patient, she is agreeable to wait for follow up visit scheduled 03/27. Will review her home blood pressure readings and make medication adjustments if appropriate.     Reviewed with patient if she feels any symptoms of severe hypertension to seek medical help right away and encouraged patient to contact clinical pharmacist if she has any additional issues or concerns prior to next scheduled follow up.     Reena Kessler (Suji), PharmD  PGY-1  Meds Pharmacy Resident

## 2025-03-27 ENCOUNTER — APPOINTMENT (OUTPATIENT)
Dept: PHARMACY | Facility: HOSPITAL | Age: 85
End: 2025-03-27
Payer: MEDICARE

## 2025-03-27 DIAGNOSIS — I10 PRIMARY HYPERTENSION: ICD-10-CM

## 2025-03-27 RX ORDER — AMLODIPINE BESYLATE 5 MG/1
5 TABLET ORAL DAILY
Qty: 30 TABLET | Refills: 1 | Status: SHIPPED | OUTPATIENT
Start: 2025-03-27

## 2025-03-27 NOTE — PROGRESS NOTES
.  Clinical Pharmacy Appointment    Patient ID: Ileana Edgar is a 84 y.o. female who presents for blood pressure management.    Pt is here for Follow Up appointment.     Referring Provider: Enrrique Hernandez DO  PCP: Enrrique Hernandez DO   Last visit with PCP: 2/4/2025   Next visit with PCP: 4/22/2025    Subjective     Interval History  Patient referred to clinical pharmacy for BPM validation and blood pressure management. Had elevated blood pressure readings, was not able to validate home BP monitor and recommended to purchase new blood pressure monitor. Patient also had a surgical procedure that had been delayed due to BP >160/90. Patient's BP continued to increase in-office due to concerns of high blood pressure and potential delay in surgical procedure again. Increased lisinopril to 40 mg daily in effort to reach goals for upcoming surgery (completed on 2/19).     At last clinical pharmacy encounter 02/27/25 - patient's BP still within HTN Stage 2 range, however BP was variable but overall had a down trend. Planned to continue to monitor BP closely at home.     At last clinical pharmacy encounter (03/13/25) - patient's hypertension still uncontrolled, increased carvedilol from 3.125 mg BID to 6.25 mg BID. Average pulse 76 bpm (02/27/25).       HPI  HYPERTENSION ASSESSMENT  Medication Therapy  Current Medications:   Lisinopril 40 mg daily   BMP to completed before next follow up with Dr. Hernandez  Carvedilol 6.25 mg BID  Adherence: Denies, uses pill box  Adverse effects: Reports some fatigue, slight dizziness, headache resolves within 1 hour - HA and dizziness tend to occur 30 min after taking medications, denies SOB or sudden palpitations    Previous Medications:   Lisinopril 30 mg daily (dose adjustment)    Home BP Monitoring  BP Cuff Type: Digital arm monitor - [employer] purchased 02/12/25  Cuff Validated? Yes, comes pre-validated    Current home BP readings:    DATE  SBP DBP HR  03/21/25 184 85     155 81     146 74     154 81         03/22/25 175 82     132 70 66    149 75 74    03/23/25 173 82 65    169 85 63    155 70 70    160 77 67     03/27/25 182 80     160 79     146 70             Average BP: 160/78 mmHg  Average HR: 68 bpm    Previous home BP readings:   03/13/25:  DATE  SBP DBP HR Comments  02/28/25 160 74 73 AM after meds    163 78 69 Evening             03/01/25 160 88 70 AM after meds    155 76 77 Afternoon    139 69 78 Evening             03/02/25 164 81 65 AM after meds    154 77 73 Afternoon    143 79 77 Evening             03/03/25 142 72 72 AM after meds    140 71 73 Afternoon          03/04/25 166 80 73 AM after meds    144 75 69 Afternoon    161 77 69 Evening             03/05/25 134 73 67 AM after meds    155 79 67 Afternoon    155 84 70 Evening               03/06/25 148 73 75 AM after meds    150 70 71 Afternoon    180 81 71 Stressed - evening  03/07/25 138 72 76 AM after meds    156 77 73 Afternoon    142 70 77 Evening               03/08/25 146 74 67 AM after meds    145 70 68 Afternoon    156 76 73 Evening              03/09/25 147 73 66 AM after meds    173 79 63 Evening              03/10/25 164 75 71 AM after meds    146 74 69 Afternoon    156 78 69 Evening              03/11/25 160 77 77 AM after meds    155 74 69 Afternoon    149 79 69 Evening              03/12/25 146 73 67 AM after meds    139 77 73 Afternoon    158 76 68 Evening             03/13/25 141 78 73 AM after meds    138 66 67 Afternoon         Average BP: 152/76 mmHg  Average HR: 71 bpm    02/27/25:  Average BP: 161/83  Average Pulse: 76      BP Readings from Last 5 Encounters:   02/12/25 (!) 225/94   02/04/25 (!) 198/100   10/23/24 120/70   08/23/24 134/72   08/06/24 128/70       Lifestyle  Diet: 3 meals/day. - no new changes  Breakfast: oatmeal + a prune + a piece of dry toast  Lunch: turkey sandwich; egg; doshi lettuce sandwich; chocolate chip cookie or two  Dinner: steak  with potatoes and salad; hamburger with fries; fish; chicken breast  Snacks: some potato chips but hasn't been snacking lately; popcorn with some salt  Drinks: water, quit coffee  Sodium Intake:  Adds to cooking  Physical Activity: used to mall walk but  hasn't been able to so they have not gone in a while    Risk Assessment  Major Risk Factors Include:  Hypertension  Dyslipidemia  Age > 55 (men) or > 65 (women)    The ASCVD Risk score (Frannie NEGRO, et al., 2019) failed to calculate for the following reasons:    The 2019 ASCVD risk score is only valid for ages 40 to 79    Known target organ damage:  None    Primary Prevention  On Statin?: Yes, simvastatin 40 mg   On Aspirin?: No  Immunizations Needed: RSV  Tobacco Use: non-smoker     Medication Reconciliation:  Changed: N/A  Added: N/A  Discontinued: N/A    Drug Interactions  No relevant drug interactions were noted.    Medication System Management  Patient's preferred pharmacy: [facility] - [phone]  Adherence/Organization: No issues  Affordability/Accessibility: No concerns       Objective   Allergies   Allergen Reactions    Ciprofloxacin Unknown     Hives, could not walk    Nitrofurantoin Monohyd/M-Cryst Fever    Sulfa (Sulfonamide Antibiotics) Hives     Social History     Social History Narrative    Not on file      Medication Review  Current Outpatient Medications   Medication Instructions    acetaminophen (Tylenol) 325 mg tablet 3 times daily PRN    amLODIPine (NORVASC) 5 mg, oral, Daily    anastrozole (Arimidex) 1 mg tablet 1 tablet, Daily    calcium carbonate-vit D3-min 600 mg calcium- 400 unit tablet 2 tablets, Daily    carvedilol (COREG) 6.25 mg, oral, 2 times daily    lisinopril 40 mg, oral, Daily    multivitamin tablet 1 tablet, Daily    simvastatin (ZOCOR) 40 mg, oral, Nightly      Vitals  BP Readings from Last 2 Encounters:   02/12/25 (!) 225/94   02/04/25 (!) 198/100     BMI Readings from Last 1 Encounters:   02/04/25 23.41  "kg/m²      Labs  A1C  No results found for: \"HGBA1C\"  BMP  Lab Results   Component Value Date    CALCIUM 9.4 10/23/2024     10/23/2024    K 4.0 10/23/2024    CO2 27 10/23/2024     10/23/2024    BUN 16 10/23/2024    CREATININE 0.77 10/23/2024    EGFR 76 10/23/2024     LFTs  Lab Results   Component Value Date    ALT 14 10/23/2024    AST 19 10/23/2024    ALKPHOS 55 10/23/2024    BILITOT 0.8 10/23/2024     FLP  Lab Results   Component Value Date    TRIG 90 10/23/2024    CHOL 158 10/23/2024    LDLF 59 10/27/2022    LDLCALC 53 10/23/2024    HDL 87.5 10/23/2024     Urine Microalbumin  Lab Results   Component Value Date    MICROALBCREA 21.0 10/19/2017     Weight Management  Wt Readings from Last 3 Encounters:   25 58.1 kg (128 lb)   10/23/24 57.2 kg (126 lb)   24 56.7 kg (125 lb)        Assessment/Plan   Problem List Items Addressed This Visit       Hypertension     ASSESSMENT OF SMBP MEASUREMENTS  Highest readin/85 mmHg  Lowest readin/70 mmHg  Average: 160/78 mmHg    Patient's goal BP < 140/80  Based on recent home blood pressure readings, patient's blood pressure is uncontrolled - majority of her SBP remain >160 mmHg.   Patient confirmed taking blood pressure medications as directed and denied adherence issues.  Report some fatigue, slight dizziness, and mild headache especially after taking blood pressure medication, however they do resolve usually within an hour.   Denied s/sx of hypotension or severe hypertension.     Rationale for plan:   Due to patient's blood pressure being uncontrolled despite good adherence to medications, will start patient on amlodipine 5 mg once daily, especially considering baseline BP is >=20/10 mmHg higher than the target blood pressure.   Counseled on medication, potential side effects, and continue to monitor blood pressure regularly.   Patient instructed to HOLD simvastatin due to potential DDI with amlodipine.  TC: 158 (10/23/24)  LDL: 53 " (10/23/24)  Discussed with patient to contact clinical pharmacist if any issues or concerns should arise prior to next follow up.   Will follow up in 2 weeks to assess medication tolerability and blood pressure control.     Medication Changes:  CONTINUE  Lisinopril 40 mg once daily  Carvedilol 6.25 mg BID  HOLD  Simvastatin 40 mg once daily  START  Amlodipine 5 mg once daily    Monitoring and Education Discussed:  Reviewed the impact of limiting sodium in diet on blood pressure - recommended <1500 mg/day, discussed how to check nutrition labels, and to limit deli meats as they can be high in sodium.  Reviewed blood pressure measuring technique and to give herself 5-10 minutes of quiet time before taking BP.          Relevant Medications    amLODIPine (Norvasc) 5 mg tablet    Other Relevant Orders    Referral to Clinical Pharmacy     Clinical Pharmacist follow-up: 04/17/25, Telehealth visit  Continue all meds under the continuation of care with the referring provider and clinical pharmacy team.    Reena Kessler (Suji), PharmD  PGY-1  Meds Pharmacy Resident     Verbal consent to manage patient's drug therapy was obtained from the patient. They were informed they may decline to participate or withdraw from participation in pharmacy services at any time.

## 2025-03-27 NOTE — ASSESSMENT & PLAN NOTE
ASSESSMENT OF SMBP MEASUREMENTS  Highest readin/85 mmHg  Lowest readin/70 mmHg  Average: 160/78 mmHg    Patient's goal BP < 140/80  Based on recent home blood pressure readings, patient's blood pressure is uncontrolled - majority of her SBP remain >160 mmHg.   Patient confirmed taking blood pressure medications as directed and denied adherence issues.  Report some fatigue, slight dizziness, and mild headache especially after taking blood pressure medication, however they do resolve usually within an hour.   Denied s/sx of hypotension or severe hypertension.     Rationale for plan:   Due to patient's blood pressure being uncontrolled despite good adherence to medications, will start patient on amlodipine 5 mg once daily, especially considering baseline BP is >=20/10 mmHg higher than the target blood pressure.   Counseled on medication, potential side effects, and continue to monitor blood pressure regularly.   Patient instructed to HOLD simvastatin due to potential DDI with amlodipine.  TC: 158 (10/23/24)  LDL: 53 (10/23/24)  Discussed with patient to contact clinical pharmacist if any issues or concerns should arise prior to next follow up.   Will follow up in 2 weeks to assess medication tolerability and blood pressure control.     Medication Changes:  CONTINUE  Lisinopril 40 mg once daily  Carvedilol 6.25 mg BID  HOLD  Simvastatin 40 mg once daily  START  Amlodipine 5 mg once daily    Monitoring and Education Discussed:  Reviewed the impact of limiting sodium in diet on blood pressure - recommended <1500 mg/day, discussed how to check nutrition labels, and to limit deli meats as they can be high in sodium.  Reviewed blood pressure measuring technique and to give herself 5-10 minutes of quiet time before taking BP.

## 2025-04-17 ENCOUNTER — APPOINTMENT (OUTPATIENT)
Dept: PHARMACY | Facility: HOSPITAL | Age: 85
End: 2025-04-17
Payer: MEDICARE

## 2025-04-17 DIAGNOSIS — I10 PRIMARY HYPERTENSION: ICD-10-CM

## 2025-04-17 NOTE — PROGRESS NOTES
.  Clinical Pharmacy Appointment    Patient ID: Ileana Edgar is a 84 y.o. female who presents for blood pressure management.    Pt is here for Follow Up appointment.     Referring Provider: Enrrique Hernandez DO  PCP: Enrrique Hernandez DO   Last visit with PCP: 2/4/2025   Next visit with PCP: 4/22/2025    Subjective     Interval History  Patient referred to clinical pharmacy for BPM validation and blood pressure management. Patient's BP consistently >160/80 and potentially have significant white coat hypertension. Dr. Hernandez had increase lisinopril from 20 mg to 30 mg prior to meeting with clinical pharmacy. In efforts to control patient's BP for surgical procedure, increase lisinopril to 40 mg. Patient's home BP continues to remain uncontrolled and variable. Increased carvedilol from 3.125 mg BID to 6.25 mg BID. Average pulse 76 bpm (02/27/25).     At last clinical pharmacy encounter (03/27/25) - blood pressure is uncontrolled and majority of her SBP remain >160 mmHg. Initiated patient on amlodipine 5 mg once daily. Patient instructed to HOLD simvastatin due to potential DDI with amlodipine.  TC: 158 (10/23/24)  LDL: 53 (10/23/24)      HPI  HYPERTENSION ASSESSMENT  Medication Therapy  Current Medications:   Lisinopril 40 mg daily in the evening  BMP to completed before next follow up with Dr. Hernandez  Carvedilol 6.25 mg BID  Amlodipine 5 mg once daily in the morning  Adherence: Denies, uses pill box  Adverse effects: Denies    Previous Medications:   Lisinopril 30 mg daily (dose adjustment)    Home BP Monitoring  BP Cuff Type: Digital arm monitor - [employer] purchased 02/12/25  Cuff Validated? Yes, comes pre-validated    Current home BP readings: Patient has noticed that her BP does lower significantly most days if she gives herself 5-10 minutes to relax/quiet  time    DATE  SBP DBP HR  04/03/25 168 81 67    139 69 70    132 68 70    151 70 67  04/04/25 154 78 67    140 69 69    136 67 74    138 71 68    145 71 67  04/05/25 159 79 67    142 70 68  04/06/25 162 78 66    148 77 67  04/07/25 151 77 68    154 74 65  04/08/25 159 73 63    132 70 66    149 70 68  04/09/25 155 73 68    144 72 71    150 75 67  04/10/25 160 75 66    161 78 68  04/11/25 148 74 69    166 81 71    157 75 70  04/12/25 159 78 67    127 63 69    127 69 72  04/13/25 155 77 66    124 63 68    138 67 70    146 74 63  04/14/25 142 67 65    144 70 69    127 68 69  04/15/25 153 75 68    141 71 62    138 66 69    174 79 66  04/16/25 152 71 67    136 70 70    140 76 68  04/17/25 149 71 67    135 69 62    125 64 70  Average BP: 146/72  Average HR: 68    Previous home BP readings:   03/27/25:  DATE  SBP DBP HR  03/21/25 184 85     155 81     146 74     154 81         03/22/25 175 82     132 70 66    149 75 74    03/23/25 173 82 65    169 85 63    155 70 70    160 77 67     03/27/25 182 80     160 79     146 70             Average BP: 160/78 mmHg  Average HR: 68 bpm    03/13/25:  Average BP: 152/76 mmHg  Average HR: 71 bpm    02/27/25:  Average BP: 161/83  Average Pulse: 76      BP Readings from Last 5 Encounters:   02/12/25 (!) 225/94   02/04/25 (!) 198/100   10/23/24 120/70   08/23/24 134/72   08/06/24 128/70       Lifestyle  Diet: 3 meals/day. - no new changes  Breakfast: oatmeal + a prune + a piece of dry toast  Lunch: turkey sandwich; egg; doshi lettuce sandwich; chocolate chip cookie or two  Dinner: steak with potatoes and salad; hamburger with fries; fish; chicken breast  Snacks: some potato chips but hasn't been snacking lately; popcorn with some salt  Drinks: water, quit coffee  Sodium Intake:  Adds to cooking  Physical Activity: no new changes  used to mall walk but  hasn't been able to so they have not gone in a while    Risk Assessment  Major Risk Factors Include:  Hypertension  Dyslipidemia  Age  "> 55 (men) or > 65 (women)    The ASCVD Risk score (Frannie NEGRO, et al., 2019) failed to calculate for the following reasons:    The 2019 ASCVD risk score is only valid for ages 40 to 79    Known target organ damage:  None    Primary Prevention  On Statin?: Yes, simvastatin 40 mg   On Aspirin?: No  Immunizations Needed: RSV  Tobacco Use: non-smoker     Medication Reconciliation:  Changed: N/A  Added: N/A  Discontinued: N/A    Drug Interactions  No relevant drug interactions were noted.    Medication System Management  Patient's preferred pharmacy: [facility] [place]  [phone]  Adherence/Organization: No issues  Affordability/Accessibility: No concerns       Objective   Allergies   Allergen Reactions    Ciprofloxacin Unknown     Hives, could not walk    Nitrofurantoin Monohyd/M-Cryst Fever    Sulfa (Sulfonamide Antibiotics) Hives     Social History     Social History Narrative    Not on file      Medication Review  Current Outpatient Medications   Medication Instructions    acetaminophen (Tylenol) 325 mg tablet 3 times daily PRN    amLODIPine (NORVASC) 5 mg, oral, Daily    anastrozole (Arimidex) 1 mg tablet 1 tablet, Daily    calcium carbonate-vit D3-min 600 mg calcium- 400 unit tablet 2 tablets, Daily    carvedilol (COREG) 6.25 mg, oral, 2 times daily    lisinopril 40 mg, oral, Daily    multivitamin tablet 1 tablet, Daily    simvastatin (ZOCOR) 40 mg, oral, Nightly      Vitals  BP Readings from Last 2 Encounters:   02/12/25 (!) 225/94   02/04/25 (!) 198/100     BMI Readings from Last 1 Encounters:   02/04/25 23.41 kg/m²      Labs  A1C  No results found for: \"HGBA1C\"  BMP  Lab Results   Component Value Date    CALCIUM 9.4 10/23/2024     10/23/2024    K 4.0 10/23/2024    CO2 27 10/23/2024     10/23/2024    BUN 16 10/23/2024    CREATININE 0.77 10/23/2024    EGFR 76 10/23/2024     LFTs  Lab Results   Component Value Date    ALT 14 10/23/2024    AST 19 10/23/2024    ALKPHOS 55 10/23/2024    BILITOT " 0.8 10/23/2024     FLP  Lab Results   Component Value Date    TRIG 90 10/23/2024    CHOL 158 10/23/2024    LDLF 59 10/27/2022    LDLCALC 53 10/23/2024    HDL 87.5 10/23/2024     Urine Microalbumin  Lab Results   Component Value Date    MICROALBCREA 21.0 10/19/2017     Weight Management  Wt Readings from Last 3 Encounters:   25 58.1 kg (128 lb)   10/23/24 57.2 kg (126 lb)   24 56.7 kg (125 lb)        Assessment/Plan   Problem List Items Addressed This Visit       Hypertension    ASSESSMENT OF SMBP MEASUREMENTS  Highest readin/79 mmHg  Lowest readin/63 mmHg  Average: 146/72 mmHg    Patient's goal BP < 140/80   Based on average home blood pressure, patient is still above goal. BP range is variable, >60% of SBP readings have generally been >140.  Patient has noted most times there is a significant decrease in BP when she gives herself 5-10 minutes to relax.   Patient confirmed she has been taking her blood pressure medications as directed.  Denied adherence issues or adverse side effects  Reported feelings of fatigue and headache has resolved, denied any new swelling in limbs.  Denied s/sx of hypotension or severe hypertension.  Confirmed not taking simvastatin at this time as directed by clinical pharmacist.   Will contact Dr. Hernandez about holding simvastatin and recommendations for changing statin if Dr. Hernandez deems it appropriate.     Rationale for plan:   Due to recent medication changes and patient's average home BP is almost almost at goal, will not make any new medication changes at this time.  Will continue to monitor patient's home blood pressure closely.  Patient has follow up with Dr. Hernandez  and will most likely to have labs done at that time - will review labs for medication safety once available.  Will follow up in one month to reassess blood pressure control and if medications changes are warranted.    Medication Changes:  CONTINUE  Lisinopril 40 mg daily in the  evening  Carvedilol 6.25 mg BID  Amlodipine 5 mg once daily in the morning  HOLD  Simvastatin    Future Considerations:  Once blood pressure is controlled and on stable medication regimen - consider combination antihypertensive options to help with pill burden.          Relevant Orders    Referral to Clinical Pharmacy     Clinical Pharmacist follow-up: 05/15/25, Telehealth visit  Continue all meds under the continuation of care with the referring provider and clinical pharmacy team.    Reena Kessler (Suji), PharmD  PGY-1  Meds Pharmacy Resident     Verbal consent to manage patient's drug therapy was obtained from the patient. They were informed they may decline to participate or withdraw from participation in pharmacy services at any time.

## 2025-04-17 NOTE — ASSESSMENT & PLAN NOTE
ASSESSMENT OF SMBP MEASUREMENTS  Highest readin/79 mmHg  Lowest readin/63 mmHg  Average: 146/72 mmHg    Patient's goal BP < 140/80   Based on average home blood pressure, patient is still above goal. BP range is variable, >60% of SBP readings have generally been >140.  Patient has noted most times there is a significant decrease in BP when she gives herself 5-10 minutes to relax.   Patient confirmed she has been taking her blood pressure medications as directed.  Denied adherence issues or adverse side effects  Reported feelings of fatigue and headache has resolved, denied any new swelling in limbs.  Denied s/sx of hypotension or severe hypertension.  Confirmed not taking simvastatin at this time as directed by clinical pharmacist.   Will contact Dr. Hernandez about holding simvastatin and recommendations for changing statin if Dr. Hernandez deems it appropriate.     Rationale for plan:   Due to recent medication changes and patient's average home BP is almost almost at goal, will not make any new medication changes at this time.  Will continue to monitor patient's home blood pressure closely.  Patient has follow up with Dr. Hernandez  and will most likely to have labs done at that time - will review labs for medication safety once available.  Will follow up in one month to reassess blood pressure control and if medications changes are warranted.    Medication Changes:  CONTINUE  Lisinopril 40 mg daily in the evening  Carvedilol 6.25 mg BID  Amlodipine 5 mg once daily in the morning  HOLD  Simvastatin    Future Considerations:  Once blood pressure is controlled and on stable medication regimen - consider combination antihypertensive options to help with pill burden.

## 2025-04-22 ENCOUNTER — APPOINTMENT (OUTPATIENT)
Dept: PRIMARY CARE | Facility: CLINIC | Age: 85
End: 2025-04-22
Payer: MEDICARE

## 2025-04-22 VITALS
RESPIRATION RATE: 16 BRPM | BODY MASS INDEX: 23.41 KG/M2 | WEIGHT: 128 LBS | SYSTOLIC BLOOD PRESSURE: 130 MMHG | HEART RATE: 77 BPM | OXYGEN SATURATION: 100 % | DIASTOLIC BLOOD PRESSURE: 80 MMHG | TEMPERATURE: 97.4 F

## 2025-04-22 DIAGNOSIS — I10 PRIMARY HYPERTENSION: ICD-10-CM

## 2025-04-22 DIAGNOSIS — E78.2 MIXED HYPERLIPIDEMIA: Primary | ICD-10-CM

## 2025-04-22 PROCEDURE — 1160F RVW MEDS BY RX/DR IN RCRD: CPT | Performed by: INTERNAL MEDICINE

## 2025-04-22 PROCEDURE — 99214 OFFICE O/P EST MOD 30 MIN: CPT | Performed by: INTERNAL MEDICINE

## 2025-04-22 PROCEDURE — G2211 COMPLEX E/M VISIT ADD ON: HCPCS | Performed by: INTERNAL MEDICINE

## 2025-04-22 PROCEDURE — 1159F MED LIST DOCD IN RCRD: CPT | Performed by: INTERNAL MEDICINE

## 2025-04-22 PROCEDURE — 1036F TOBACCO NON-USER: CPT | Performed by: INTERNAL MEDICINE

## 2025-04-22 PROCEDURE — 3075F SYST BP GE 130 - 139MM HG: CPT | Performed by: INTERNAL MEDICINE

## 2025-04-22 PROCEDURE — 3079F DIAST BP 80-89 MM HG: CPT | Performed by: INTERNAL MEDICINE

## 2025-04-22 RX ORDER — AMLODIPINE BESYLATE 5 MG/1
5 TABLET ORAL DAILY
Qty: 90 TABLET | Refills: 3 | Status: SHIPPED | OUTPATIENT
Start: 2025-04-22

## 2025-04-22 RX ORDER — ROSUVASTATIN CALCIUM 10 MG/1
10 TABLET, COATED ORAL DAILY
Qty: 100 TABLET | Refills: 3 | Status: SHIPPED | OUTPATIENT
Start: 2025-04-22 | End: 2026-05-27

## 2025-04-22 RX ORDER — LISINOPRIL 40 MG/1
40 TABLET ORAL DAILY
Qty: 90 TABLET | Refills: 3 | Status: SHIPPED | OUTPATIENT
Start: 2025-04-22

## 2025-04-22 RX ORDER — CARVEDILOL 6.25 MG/1
6.25 TABLET ORAL 2 TIMES DAILY
Qty: 90 TABLET | Refills: 3 | Status: SHIPPED | OUTPATIENT
Start: 2025-04-22

## 2025-04-22 RX ORDER — ROSUVASTATIN CALCIUM 10 MG/1
10 TABLET, COATED ORAL DAILY
Qty: 100 TABLET | Refills: 3 | Status: SHIPPED | OUTPATIENT
Start: 2025-04-22 | End: 2025-04-22 | Stop reason: SDUPTHER

## 2025-04-22 ASSESSMENT — PATIENT HEALTH QUESTIONNAIRE - PHQ9
1. LITTLE INTEREST OR PLEASURE IN DOING THINGS: NOT AT ALL
SUM OF ALL RESPONSES TO PHQ9 QUESTIONS 1 AND 2: 0
2. FEELING DOWN, DEPRESSED OR HOPELESS: NOT AT ALL

## 2025-04-22 NOTE — PROGRESS NOTES
Patient here for a follow up    Subjective   Patient ID: Ileana Edgar is a 84 y.o. female who presents for Follow-up.  She is accompanied by her  who offers some of the history.     The patient has been monitoring her blood pressure, and states that home readings have been well controlled.  The blood pressure in the clinic today was 130/80 mmHg which reflects about her average.  The patient is currently maintained with amlodipine 5mg once daily, carvedilol 6.25mg twice daily, and lisinopril 20mg as two tablets once daily which she is tolerating well.  She continues to follow with Pharmacy.    The patient recently underwent MOHS procedure for a skin lesion on the posterior scalp that was confirmed to be squamous cell carcinoma.  She continues to follow with  from Dermatology.    The patient denies any abdominal pain or bowel problems.      Review of Systems   All other systems reviewed and are negative.      Objective   Physical Exam  Constitutional:       Appearance: Normal appearance.   Neck:      Vascular: No carotid bruit.   Cardiovascular:      Rate and Rhythm: Normal rate and regular rhythm.      Heart sounds: Normal heart sounds.   Pulmonary:      Effort: Pulmonary effort is normal.      Breath sounds: Normal breath sounds.   Abdominal:      General: Bowel sounds are normal.      Palpations: Abdomen is soft.      Tenderness: There is no abdominal tenderness.   Skin:     General: Skin is warm and dry.   Neurological:      General: No focal deficit present.      Mental Status: She is alert and oriented to person, place, and time. Mental status is at baseline.   Psychiatric:         Mood and Affect: Mood normal.         Behavior: Behavior normal.       Assessment/Plan   Problem List Items Addressed This Visit           ICD-10-CM    Hyperlipidemia - Primary E78.5    Relevant Medications    rosuvastatin (Crestor) 10 mg tablet    Other Relevant Orders    Lipid panel    Comprehensive metabolic panel     CBC and Auto Differential    Tsh With Reflex To Free T4 If Abnormal    Hypertension I10    Relevant Medications    amLODIPine (Norvasc) 5 mg tablet    lisinopril 40 mg tablet    carvedilol (Coreg) 6.25 mg tablet       IMPRESSIONS/PLAN:     HTN   - /80 today in office.  Continue amlodipine 5mg QD, carvedilol 6.25mg BID and lisinopril 20mg as two tablets QD.  Tolerating well.     HLD   - Stable.  STOP simvastatin 40mg QD.  START rosuvastatin 10mg once daily.     Palpitations  - She is NSR on exam today.   Continue Carvedilol 6.25 mg BID.  This seems to be working very well.  No longer symptomatic.  BP well controlled too.  Home readings have been acceptable.  She had monitor in 2022 which showed brief runs of SVT and PVCs- no signs of Afib.  Echo 8/2024 showed normal LVSF with EF 65%.     Right Breast Cancer   - Diagnosed 2/2021 Stage 3A, s/p right mastectomy on 3/10/2021. Currently following Hematology/Oncology, previously Following with Dr. Laurent from Breast Surgery, maintained on anastrozole 1mg QD and Prolia injections.     Osteopenia   - Takes Calcium 600+D 600-400mg 2 tabs daily. She is also on Prolia.     Loose Stool  - Advised patient to increase dietary fiber.  Call the clinic if watery diarrhea recurs, and will order Stool Pathogen Panel PCR and other testing.  Last colonoscopy 7/2022 showed large asymptomatic internal and external hemorrhoids.     Vitamin D Deficiency   - Takes Vitamin D3 50mcg QD.      Health Maintenance   - Routine labs ordered including CBC, CMP, and a lipid panel to be completed in the fasting state. Added TSH.  Last Mammogram 1/2025. Last colonoscopy 7/2022, no repeat due to age.       Follow up in 6 months, call sooner if needed.       Scribe Attestation  By signing my name below, I, Kanchan Gastelum   attest that this documentation has been prepared under the direction and in the presence of Enrrique Hernandez DO.   Kellee Zambrano 04/22/25 9:34 AM

## 2025-04-23 LAB
ALBUMIN SERPL-MCNC: 4.6 G/DL (ref 3.6–5.1)
ALP SERPL-CCNC: 49 U/L (ref 37–153)
ALT SERPL-CCNC: 14 U/L (ref 6–29)
ANION GAP SERPL CALCULATED.4IONS-SCNC: 9 MMOL/L (CALC) (ref 7–17)
AST SERPL-CCNC: 19 U/L (ref 10–35)
BASOPHILS # BLD AUTO: 30 CELLS/UL (ref 0–200)
BASOPHILS NFR BLD AUTO: 0.6 %
BILIRUB SERPL-MCNC: 0.7 MG/DL (ref 0.2–1.2)
BUN SERPL-MCNC: 15 MG/DL (ref 7–25)
CALCIUM SERPL-MCNC: 9.3 MG/DL (ref 8.6–10.4)
CHLORIDE SERPL-SCNC: 104 MMOL/L (ref 98–110)
CHOLEST SERPL-MCNC: 250 MG/DL
CHOLEST/HDLC SERPL: 2.7 (CALC)
CO2 SERPL-SCNC: 29 MMOL/L (ref 20–32)
CREAT SERPL-MCNC: 0.65 MG/DL (ref 0.6–0.95)
EGFRCR SERPLBLD CKD-EPI 2021: 86 ML/MIN/1.73M2
EOSINOPHIL # BLD AUTO: 90 CELLS/UL (ref 15–500)
EOSINOPHIL NFR BLD AUTO: 1.8 %
ERYTHROCYTE [DISTWIDTH] IN BLOOD BY AUTOMATED COUNT: 12.4 % (ref 11–15)
GLUCOSE SERPL-MCNC: 103 MG/DL (ref 65–99)
HCT VFR BLD AUTO: 36.4 % (ref 35–45)
HDLC SERPL-MCNC: 92 MG/DL
HGB BLD-MCNC: 12.3 G/DL (ref 11.7–15.5)
LDLC SERPL CALC-MCNC: 138 MG/DL (CALC)
LYMPHOCYTES # BLD AUTO: 1145 CELLS/UL (ref 850–3900)
LYMPHOCYTES NFR BLD AUTO: 22.9 %
MCH RBC QN AUTO: 33.9 PG (ref 27–33)
MCHC RBC AUTO-ENTMCNC: 33.8 G/DL (ref 32–36)
MCV RBC AUTO: 100.3 FL (ref 80–100)
MONOCYTES # BLD AUTO: 625 CELLS/UL (ref 200–950)
MONOCYTES NFR BLD AUTO: 12.5 %
NEUTROPHILS # BLD AUTO: 3110 CELLS/UL (ref 1500–7800)
NEUTROPHILS NFR BLD AUTO: 62.2 %
NONHDLC SERPL-MCNC: 158 MG/DL (CALC)
PLATELET # BLD AUTO: 203 THOUSAND/UL (ref 140–400)
PMV BLD REES-ECKER: 9.6 FL (ref 7.5–12.5)
POTASSIUM SERPL-SCNC: 4 MMOL/L (ref 3.5–5.3)
PROT SERPL-MCNC: 7 G/DL (ref 6.1–8.1)
RBC # BLD AUTO: 3.63 MILLION/UL (ref 3.8–5.1)
SODIUM SERPL-SCNC: 142 MMOL/L (ref 135–146)
TRIGL SERPL-MCNC: 92 MG/DL
TSH SERPL-ACNC: 0.92 MIU/L (ref 0.4–4.5)
WBC # BLD AUTO: 5 THOUSAND/UL (ref 3.8–10.8)

## 2025-05-09 ENCOUNTER — APPOINTMENT (OUTPATIENT)
Dept: PHARMACY | Facility: HOSPITAL | Age: 85
End: 2025-05-09
Payer: MEDICARE

## 2025-05-09 DIAGNOSIS — I10 PRIMARY HYPERTENSION: ICD-10-CM

## 2025-05-09 NOTE — PROGRESS NOTES
.  Clinical Pharmacy Appointment    Patient ID: Ileana Edgar is a 84 y.o. female who presents for blood pressure management.    Pt is here for Follow Up appointment.     Referring Provider: Enrrique Hernandez DO  PCP: Enrrique Hernandez DO   Last visit with PCP: 2/4/2025   Next visit with PCP: 4/22/2025    Subjective     Interval History  Patient referred to clinical pharmacy for BPM validation and blood pressure management. Patient's BP consistently >160/80 and potentially have significant white coat hypertension. Dr. Hernandez had increase lisinopril from 20 mg to 30 mg prior to meeting with clinical pharmacy. In efforts to control patient's BP for surgical procedure, increase lisinopril to 40 mg. Patient's home BP continues to remain uncontrolled and variable. Increased carvedilol from 3.125 mg BID to 6.25 mg BID. Average pulse 76 bpm (02/27/25).     At last clinical pharmacy encounter (04/17/25) - patient's average home BP is almost almost at goal with lisinopril, carvedilol and addition of amlodipine, therefore deferred additional medication adjustments at the time. Patient's simvastatin was also changed to rosuvastatin (04/22/25) to avoid DDI between amlodipine and simvastatin.   TC: 250 (04/22/25)  LDL: 138 (04/22/25)      HPI  HYPERTENSION ASSESSMENT  Medication Therapy  Current Medications:   Lisinopril 40 mg daily in the evening  Potassium 4.0 (04/22/25)  Carvedilol 6.25 mg BID  Amlodipine 5 mg once daily in the morning  Adherence: Denies, uses pill box  Adverse effects: denies, some dizziness in the morning, but goes away.     Previous Medications:   Lisinopril 30 mg daily (dose adjustment)    Home BP Monitoring  BP Cuff Type: Digital arm monitor - [employer] purchased 02/12/25  Cuff Validated? Yes, comes pre-validated    Current home BP readings: Generally checks BP in the AM, afternoon, and in the evening  Patient has noticed that her BP does lower significantly most days if she gives herself 5-10 minutes to  relax/quiet time.  Noted that her higher morning numbers are usually because she took her readings before morning meds.   Feels her higher BP readings can be attributed to either being anxious or stressed.     DATE  SBP DBP HR  05/01/25 144 69 64    129 62 67    127 65 62  05/02/25 122 59 62    133 67 62    145 70 62  05/03/25 132 69 65    114 61 67    142 69 70  05/04/25 126 64 63    135 68 64    143 70 63  05/05/25 133 66 68    138 71 63  05/06/25 147 69 62    131 68 66    139 72 65  05/07/25 114 58 70    140 67 63    135 78 67    133 64 67  05/08/25 113 56 60    136 69 62    147 73 61  05/09/25 118 62 68    134 66 64  Average BP: 133/67  Average HR: 65 bpm    Previous home BP readings:   04/17/25:  DATE  SBP DBP HR  04/03/25 168 81 67    139 69 70    132 68 70    151 70 67  04/04/25 154 78 67    140 69 69    136 67 74    138 71 68    145 71 67  04/05/25 159 79 67    142 70 68  04/06/25 162 78 66    148 77 67  04/07/25 151 77 68    154 74 65  04/08/25 159 73 63    132 70 66    149 70 68  04/09/25 155 73 68    144 72 71    150 75 67  04/10/25 160 75 66    161 78 68  04/11/25 148 74 69    166 81 71    157 75 70  04/12/25 159 78 67    127 63 69    127 69 72  04/13/25 155 77 66    124 63 68    138 67 70    146 74 63  04/14/25 142 67 65    144 70 69    127 68 69  04/15/25 153 75 68    141 71 62    138 66 69    174 79 66  04/16/25 152 71 67    136 70 70    140 76 68  04/17/25 149 71 67    135 69 62    125 64 70  Average BP: 146/72  Average HR: 68    03/27/25:  Average BP: 160/78 mmHg  Average HR: 68 bpm    03/13/25:  Average BP: 152/76 mmHg  Average HR: 71 bpm    02/27/25:  Average BP: 161/83  Average Pulse: 76      BP Readings from Last 5 Encounters:   04/22/25 130/80   02/12/25 (!) 225/94   02/04/25 (!) 198/100   10/23/24 120/70   08/23/24 134/72       Lifestyle  Diet: 3 meals/day: no new changes  Breakfast: oatmeal + a prune + a piece of dry toast  Lunch: turkey sandwich; egg; doshi lettuce sandwich; chocolate chip  "cookie or two  Dinner: steak with potatoes and salad; hamburger with fries; fish; chicken breast  Snacks: some potato chips but hasn't been snacking lately; popcorn with some salt  Drinks: water, quit coffee  Sodium Intake:  Adds to cooking    Physical Activity: Walking when able, on top of household chores  used to mall walk but  hasn't been able to so they have not gone in a while    Risk Assessment  Major Risk Factors Include:  Hypertension  Dyslipidemia  Age > 55 (men) or > 65 (women)    The ASCVD Risk score (Frannie NEGRO, et al., 2019) failed to calculate for the following reasons:    The 2019 ASCVD risk score is only valid for ages 40 to 79    Known target organ damage:  None    Primary Prevention  On Statin?: Yes, rosuvastatin 10 mg  On Aspirin?: No  Immunizations Needed: N/A  Tobacco Use: non-smoker     Drug Interactions  No relevant drug interactions were noted.    Medication System Management  [facility] [phone]        Objective   Allergies   Allergen Reactions    Ciprofloxacin Unknown     Hives, could not walk    Nitrofurantoin Monohyd/M-Cryst Fever    Sulfa (Sulfonamide Antibiotics) Hives     Social History     Social History Narrative    Not on file      Medication Review  Current Outpatient Medications   Medication Instructions    acetaminophen (Tylenol) 325 mg tablet 3 times daily PRN    amLODIPine (NORVASC) 5 mg, oral, Daily    anastrozole (Arimidex) 1 mg tablet 1 tablet, Daily    calcium carbonate-vit D3-min 600 mg calcium- 400 unit tablet 2 tablets, Daily    carvedilol (COREG) 6.25 mg, oral, 2 times daily    lisinopril 40 mg, oral, Daily    multivitamin tablet 1 tablet, Daily    rosuvastatin (CRESTOR) 10 mg, oral, Daily      Vitals  BP Readings from Last 2 Encounters:   04/22/25 130/80   02/12/25 (!) 225/94     BMI Readings from Last 1 Encounters:   04/22/25 23.41 kg/m²      Labs  A1C  No results found for: \"HGBA1C\"  BMP  Lab Results "   Component Value Date    CALCIUM 9.3 2025     2025    K 4.0 2025    CO2 29 2025     2025    BUN 15 2025    CREATININE 0.65 2025    EGFR 86 2025     LFTs  Lab Results   Component Value Date    ALT 14 2025    AST 19 2025    ALKPHOS 49 2025    BILITOT 0.7 2025     FLP  Lab Results   Component Value Date    TRIG 92 2025    CHOL 250 (H) 2025    LDLF 59 10/27/2022    LDLCALC 138 (H) 2025    HDL 92 2025     Urine Microalbumin  Lab Results   Component Value Date    MICROALBCREA 21.0 10/19/2017     Weight Management  Wt Readings from Last 3 Encounters:   25 58.1 kg (128 lb)   25 58.1 kg (128 lb)   10/23/24 57.2 kg (126 lb)        Assessment/Plan   Problem List Items Addressed This Visit       Hypertension    ASSESSMENT OF SMBP MEASUREMENTS  Highest readin/73 mmHg  Lowest readin/56 mmHg --> on repeat 136/69  Average: 133/67 mmHg    Patient's goal BP < 140/80  Patient confirmed taking blood pressure medications as directed and denied any significant side effects.   Denied any s/sx of hypotension or severe hypertension.  Patient's blood pressure has some fluctuations, however majority of her readings have been within goal ~ 75-80%  Patient stated she feels most of her higher numbers can be attributed to either stress/anxiety.    Rationale for plan:   Due to patient being generally in goal will not make any medication changes at this time  - will have patient continue to monitor blood pressure at least once daily to ensure there are no significant increases in blood pressure >140/80 mmHg.   Advised pt she can reduce number of readings per day and choose to take random BP - just be mindful of when she takes her blood pressure (i.e., recently had coffee/caffeine, feeling nervous or anxious, before meds, etc.)  Will follow up for an additional month to ensure there is no significant changes to blood  pressure and is relatively stable - if stable and generally controlled can refer back to PCP after next follow up.     Medication Changes:  CONTINUE  Lisinopril 40 mg daily in the evening  Carvedilol 6.25 mg BID  Amlodipine 5 mg once daily in the morning         Relevant Orders    Referral to Clinical Pharmacy     Clinical Pharmacist follow-up: 06/13/25, Telehealth visit  Continue all meds under the continuation of care with the referring provider and clinical pharmacy team.    Reena Kessler (Suji), PharmD  PGY-1  Meds Pharmacy Resident     Verbal consent to manage patient's drug therapy was obtained from the patient. They were informed they may decline to participate or withdraw from participation in pharmacy services at any time.

## 2025-05-09 NOTE — ASSESSMENT & PLAN NOTE
ASSESSMENT OF SMBP MEASUREMENTS  Highest readin/73 mmHg  Lowest readin/56 mmHg --> on repeat 136/69  Average: 133/67 mmHg    Patient's goal BP < 140/80  Patient confirmed taking blood pressure medications as directed and denied any significant side effects.   Denied any s/sx of hypotension or severe hypertension.  Patient's blood pressure has some fluctuations, however majority of her readings have been within goal ~ 75-80%  Patient stated she feels most of her higher numbers can be attributed to either stress/anxiety.    Rationale for plan:   Due to patient being generally in goal will not make any medication changes at this time  - will have patient continue to monitor blood pressure at least once daily to ensure there are no significant increases in blood pressure >140/80 mmHg.   Advised pt she can reduce number of readings per day and choose to take random BP - just be mindful of when she takes her blood pressure (i.e., recently had coffee/caffeine, feeling nervous or anxious, before meds, etc.)  Will follow up for an additional month to ensure there is no significant changes to blood pressure and is relatively stable - if stable and generally controlled can refer back to PCP after next follow up.     Medication Changes:  CONTINUE  Lisinopril 40 mg daily in the evening  Carvedilol 6.25 mg BID  Amlodipine 5 mg once daily in the morning

## 2025-05-15 ENCOUNTER — APPOINTMENT (OUTPATIENT)
Dept: PHARMACY | Facility: HOSPITAL | Age: 85
End: 2025-05-15
Payer: MEDICARE

## 2025-06-11 NOTE — PROGRESS NOTES
.  Clinical Pharmacy Appointment    Patient ID: Ileana Edgar is a 84 y.o. female who presents for blood pressure management.    Pt is here for Follow Up appointment.     Referring Provider: Enrrique Hernandez DO  PCP: Enrrique Hernandez DO   Last visit with PCP: 04/22/25  Next visit with PCP: 10/22/25    Subjective   Medication Management:  No new changes made at this encounter    Drug Interactions  No relevant drug interactions were noted.    Medication System Management  [facility] [place]  [phone]    Interval History  Patient referred to clinical pharmacy for BPM validation and blood pressure management. Patient's BP consistently >160/80 and potentially have significant white coat hypertension. Patient has been meeting consitently with clinical pharmacy to help manage blood pressure and medications:  Lisinopril 20 mg --> 30 mg --> 40 mg   Carvedilol from 3.125 mg BID --> 6.25 mg BID  Simvastatin --> rosuvastatin (04/22/25) to avoid DDI     At last clinical pharmacy encounter (05/09/25) - patient's average home BP is generally at goal, therefore deferred additional medication adjustments at the time. Following up today to ensure patient's blood pressure has remained consistently at goal and stable.     HPI  HYPERTENSION ASSESSMENT  Medication Therapy  Current Medications:   Lisinopril 40 mg daily in the evening  Potassium 4.0 (04/22/25)  Carvedilol 6.25 mg BID  Amlodipine 5 mg once daily in the morning  Adherence: Denies, uses pill box  Adverse effects: denies, some dizziness in the morning, but goes away.     Previous Medications:   Lisinopril 30 mg daily (dose adjustment)    Home BP Monitoring  BP Cuff Type: Digital arm monitor - [employer] purchased 02/12/25  Cuff Validated? Yes, comes pre-validated    Current home BP readings: Generally checks BP in the AM, afternoon, and in the evening  Patient has noticed that her BP does lower significantly most days if she gives  herself 5-10 minutes to relax/quiet time.  DATE  SBP DBP HR  06/01/25 120 59 65    134 69 67  06/03/25 120 66 63    105 58 68  06/04/25 112 63 72    118 59 60  06/05/25 117 62 62  06/07/25 123 65 68  06/08/25 126 62 68  06/09/25 120 62 64  06/10/25 126 62 59    122 66 65  06/11/25 139 74 67  06/12/25 110 61 66    121 63 65  06/13/25 128 62 61  Average BP: 121/63 mmHg  Average HR: 65 bpm    Previous home BP readings:   05/09/25:  DATE  SBP DBP HR  05/01/25 144 69 64    129 62 67    127 65 62  05/02/25 122 59 62    133 67 62    145 70 62  05/03/25 132 69 65    114 61 67    142 69 70  05/04/25 126 64 63    135 68 64    143 70 63  05/05/25 133 66 68    138 71 63  05/06/25 147 69 62    131 68 66    139 72 65  05/07/25 114 58 70    140 67 63    135 78 67    133 64 67  05/08/25 113 56 60    136 69 62    147 73 61  05/09/25 118 62 68    134 66 64  Average BP: 133/67 mmHg  Average HR: 65 bpm    04/17/25:  Average BP: 146/72  Average HR: 68    03/27/25:  Average BP: 160/78 mmHg  Average HR: 68 bpm    03/13/25:  Average BP: 152/76 mmHg  Average HR: 71 bpm    02/27/25:  Average BP: 161/83  Average Pulse: 76      BP Readings from Last 5 Encounters:   04/22/25 130/80   02/12/25 (!) 225/94   02/04/25 (!) 198/100   10/23/24 120/70   08/23/24 134/72       Lifestyle  Diet: 3 meals/day: no new changes  Breakfast: oatmeal + a prune + a piece of dry toast  Lunch: turkey sandwich; egg; doshi lettuce sandwich; chocolate chip cookie or two  Dinner: steak with potatoes and salad; hamburger with fries; fish; chicken breast  Snacks: some potato chips but hasn't been snacking lately; popcorn with some salt  Drinks: water, quit coffee  Sodium Intake:  Adds to cooking    Physical Activity: Walking when able, on top of household chores  used to mall walk but  hasn't been able to so they have not gone in a while    Risk Assessment  Major Risk Factors Include:  Hypertension  Dyslipidemia  Age > 55 (men) or > 65 (women)    The ASCVD Risk  "score (Frannie NEGRO, et al., 2019) failed to calculate for the following reasons:    The 2019 ASCVD risk score is only valid for ages 40 to 79    Known target organ damage:  None    Primary Prevention  On Statin?: Yes, rosuvastatin 10 mg  On Aspirin?: No  Immunizations Needed: N/A  Tobacco Use: non-smoker     Drug Interactions  No relevant drug interactions were noted.    Medication System Management  Patient's preferred pharmacy: [facility] [place]  [phone]        Objective   Allergies   Allergen Reactions    Ciprofloxacin Unknown     Hives, could not walk    Nitrofurantoin Monohyd/M-Cryst Fever    Sulfa (Sulfonamide Antibiotics) Hives     Social History     Social History Narrative    Not on file      Medication Review  Current Outpatient Medications   Medication Instructions    acetaminophen (Tylenol) 325 mg tablet 3 times daily PRN    amLODIPine (NORVASC) 5 mg, oral, Daily    anastrozole (Arimidex) 1 mg tablet 1 tablet, Daily    calcium carbonate-vit D3-min 600 mg calcium- 400 unit tablet 2 tablets, Daily    carvedilol (COREG) 6.25 mg, oral, 2 times daily    lisinopril 40 mg, oral, Daily    multivitamin tablet 1 tablet, Daily    rosuvastatin (CRESTOR) 10 mg, oral, Daily      Vitals  BP Readings from Last 2 Encounters:   04/22/25 130/80   02/12/25 (!) 225/94     BMI Readings from Last 1 Encounters:   04/22/25 23.41 kg/m²      Labs  A1C  No results found for: \"HGBA1C\"  BMP  Lab Results   Component Value Date    CALCIUM 9.3 04/22/2025     04/22/2025    K 4.0 04/22/2025    CO2 29 04/22/2025     04/22/2025    BUN 15 04/22/2025    CREATININE 0.65 04/22/2025    EGFR 86 04/22/2025     LFTs  Lab Results   Component Value Date    ALT 14 04/22/2025    AST 19 04/22/2025    ALKPHOS 49 04/22/2025    BILITOT 0.7 04/22/2025     FLP  Lab Results   Component Value Date    TRIG 92 04/22/2025    CHOL 250 (H) 04/22/2025    LDLF 59 10/27/2022    LDLCALC 138 (H) 04/22/2025    HDL 92 04/22/2025     Urine " Microalbumin  Lab Results   Component Value Date    MICROALBCREA 21.0 10/19/2017     Weight Management  Wt Readings from Last 3 Encounters:   25 58.1 kg (128 lb)   25 58.1 kg (128 lb)   10/23/24 57.2 kg (126 lb)        Assessment/Plan   Problem List Items Addressed This Visit       Hypertension    ASSESSMENT OF SMBP MEASUREMENTS  Highest readin/74 mmHg  Lowest readin/58 mmHg  Average: 121/63 mmHg    Patient's goal BP < 140/80 mmHg   Based on average home blood pressure, patient's blood pressure is at goal.  Confirmed taking blood pressure medications as directed, denied any issues with adherence or adverse side effects.   Denied any s/sx of low blood pressure.   Patient reports her blood pressure may read >140/80 on the first reading, but once she gives herself some time to relax her blood pressure has remained consistently in 110-120s/60-70s    Rationale for plan:   Due to patient's average home blood pressure being controlled and at goal, will not make any medication recommendations or changes at this time.   As patient's home BP is generally stable, we will refer patient back to PCP for blood pressure management.   Discussed with patient to continue to monitor her blood pressure at home regularly, at least 2-3 times a week to ensure there are no significant and consistent changes in blood pressure.  Patient was encouraged to reach out to her PCP or Clinical Pharmacy Team if she notes her blood pressure is consistently >140/80 mmHg or if she is experiencing frequent s/sx of low pressure.     Medication Changes:  CONTINUE  Lisinopril 40 mg daily in the evening  Carvedilol 6.25 mg BID  Amlodipine 5 mg once daily in the morning        Will follow up as needed based on patient or provider request.  Continue all meds under the continuation of care with the referring provider and clinical pharmacy team.    Reena Kessler (Suji), PharmD  PGY-1  Meds Pharmacy Resident     Verbal consent to manage  patient's drug therapy was obtained from the patient. They were informed they may decline to participate or withdraw from participation in pharmacy services at any time.

## 2025-06-13 ENCOUNTER — APPOINTMENT (OUTPATIENT)
Dept: PHARMACY | Facility: HOSPITAL | Age: 85
End: 2025-06-13
Payer: MEDICARE

## 2025-06-13 DIAGNOSIS — I10 PRIMARY HYPERTENSION: ICD-10-CM

## 2025-06-13 NOTE — ASSESSMENT & PLAN NOTE
ASSESSMENT OF SMBP MEASUREMENTS  Highest readin/74 mmHg  Lowest readin/58 mmHg  Average: 121/63 mmHg    Patient's goal BP < 140/80 mmHg   Based on average home blood pressure, patient's blood pressure is at goal.  Confirmed taking blood pressure medications as directed, denied any issues with adherence or adverse side effects.   Denied any s/sx of low blood pressure.   Patient reports her blood pressure may read >140/80 on the first reading, but once she gives herself some time to relax her blood pressure has remained consistently in 110-120s/60-70s    Rationale for plan:   Due to patient's average home blood pressure being controlled and at goal, will not make any medication recommendations or changes at this time.   As patient's home BP is generally stable, we will refer patient back to PCP for blood pressure management.   Discussed with patient to continue to monitor her blood pressure at home regularly, at least 2-3 times a week to ensure there are no significant and consistent changes in blood pressure.  Patient was encouraged to reach out to her PCP or Clinical Pharmacy Team if she notes her blood pressure is consistently >140/80 mmHg or if she is experiencing frequent s/sx of low pressure.     Medication Changes:  CONTINUE  Lisinopril 40 mg daily in the evening  Carvedilol 6.25 mg BID  Amlodipine 5 mg once daily in the morning

## 2025-06-16 NOTE — PROGRESS NOTES
I reviewed the progress note and agree with the resident’s findings and plans as written. Case discussed with resident.    Haseeb Perry, AnaD

## 2025-08-28 DIAGNOSIS — I10 PRIMARY HYPERTENSION: ICD-10-CM

## 2025-08-28 RX ORDER — CARVEDILOL 6.25 MG/1
6.25 TABLET ORAL 2 TIMES DAILY
Qty: 180 TABLET | Refills: 3 | Status: SHIPPED | OUTPATIENT
Start: 2025-08-28

## 2025-10-22 ENCOUNTER — APPOINTMENT (OUTPATIENT)
Dept: PRIMARY CARE | Facility: CLINIC | Age: 85
End: 2025-10-22
Payer: MEDICARE